# Patient Record
Sex: MALE | Race: WHITE | NOT HISPANIC OR LATINO | URBAN - METROPOLITAN AREA
[De-identification: names, ages, dates, MRNs, and addresses within clinical notes are randomized per-mention and may not be internally consistent; named-entity substitution may affect disease eponyms.]

---

## 2017-01-30 ENCOUNTER — EMERGENCY (EMERGENCY)
Facility: HOSPITAL | Age: 62
LOS: 1 days | Discharge: HOME | End: 2017-01-30
Attending: EMERGENCY MEDICINE | Admitting: EMERGENCY MEDICINE
Payer: MEDICARE

## 2017-01-30 VITALS
TEMPERATURE: 97 F | OXYGEN SATURATION: 97 % | SYSTOLIC BLOOD PRESSURE: 119 MMHG | HEART RATE: 61 BPM | DIASTOLIC BLOOD PRESSURE: 74 MMHG | RESPIRATION RATE: 17 BRPM

## 2017-01-30 DIAGNOSIS — Z90.89 ACQUIRED ABSENCE OF OTHER ORGANS: ICD-10-CM

## 2017-01-30 DIAGNOSIS — R51 HEADACHE: ICD-10-CM

## 2017-01-30 DIAGNOSIS — R30.0 DYSURIA: ICD-10-CM

## 2017-01-30 DIAGNOSIS — I10 ESSENTIAL (PRIMARY) HYPERTENSION: ICD-10-CM

## 2017-01-30 DIAGNOSIS — R33.9 RETENTION OF URINE, UNSPECIFIED: ICD-10-CM

## 2017-01-30 DIAGNOSIS — R00.0 TACHYCARDIA, UNSPECIFIED: ICD-10-CM

## 2017-01-30 PROCEDURE — 99284 EMERGENCY DEPT VISIT MOD MDM: CPT | Mod: 25

## 2017-01-30 PROCEDURE — 93010 ELECTROCARDIOGRAM REPORT: CPT

## 2017-01-30 NOTE — ED ADULT NURSE NOTE - OBJECTIVE STATEMENT
pt is 61 year old male c/o urinary retention x7 hours, pt reports his stomach is distended, +back pain, +chills, no vomiting or diarrhea, no cp, no sob, no dizziness, no fevers, pt had renal failure years ago and needed emergency dialysis, no further dialysis was needed, pt thinks this feels the same

## 2017-01-31 ENCOUNTER — APPOINTMENT (OUTPATIENT)
Dept: UROLOGY | Facility: CLINIC | Age: 62
End: 2017-01-31

## 2017-01-31 VITALS
HEART RATE: 63 BPM | SYSTOLIC BLOOD PRESSURE: 135 MMHG | DIASTOLIC BLOOD PRESSURE: 79 MMHG | RESPIRATION RATE: 18 BRPM | OXYGEN SATURATION: 98 %

## 2017-01-31 LAB
ALBUMIN SERPL ELPH-MCNC: 4.3 G/DL — SIGNIFICANT CHANGE UP (ref 3.3–5)
ALP SERPL-CCNC: 43 U/L — SIGNIFICANT CHANGE UP (ref 40–120)
ALT FLD-CCNC: 22 U/L RC — SIGNIFICANT CHANGE UP (ref 10–45)
ANION GAP SERPL CALC-SCNC: 12 MMOL/L — SIGNIFICANT CHANGE UP (ref 5–17)
APPEARANCE UR: CLEAR — SIGNIFICANT CHANGE UP
AST SERPL-CCNC: 22 U/L — SIGNIFICANT CHANGE UP (ref 10–40)
BASOPHILS # BLD AUTO: 0 K/UL — SIGNIFICANT CHANGE UP (ref 0–0.2)
BASOPHILS NFR BLD AUTO: 0.5 % — SIGNIFICANT CHANGE UP (ref 0–2)
BILIRUB SERPL-MCNC: 0.5 MG/DL — SIGNIFICANT CHANGE UP (ref 0.2–1.2)
BILIRUB UR-MCNC: NEGATIVE — SIGNIFICANT CHANGE UP
BUN SERPL-MCNC: 14 MG/DL — SIGNIFICANT CHANGE UP (ref 7–23)
CALCIUM SERPL-MCNC: 9.1 MG/DL — SIGNIFICANT CHANGE UP (ref 8.4–10.5)
CHLORIDE SERPL-SCNC: 102 MMOL/L — SIGNIFICANT CHANGE UP (ref 96–108)
CO2 SERPL-SCNC: 27 MMOL/L — SIGNIFICANT CHANGE UP (ref 22–31)
COLOR SPEC: SIGNIFICANT CHANGE UP
CREAT SERPL-MCNC: 0.83 MG/DL — SIGNIFICANT CHANGE UP (ref 0.5–1.3)
DIFF PNL FLD: NEGATIVE — SIGNIFICANT CHANGE UP
EOSINOPHIL # BLD AUTO: 0.1 K/UL — SIGNIFICANT CHANGE UP (ref 0–0.5)
EOSINOPHIL NFR BLD AUTO: 1.1 % — SIGNIFICANT CHANGE UP (ref 0–6)
GLUCOSE SERPL-MCNC: 99 MG/DL — SIGNIFICANT CHANGE UP (ref 70–99)
GLUCOSE UR QL: NEGATIVE — SIGNIFICANT CHANGE UP
HCT VFR BLD CALC: 43.5 % — SIGNIFICANT CHANGE UP (ref 39–50)
HGB BLD-MCNC: 14.6 G/DL — SIGNIFICANT CHANGE UP (ref 13–17)
KETONES UR-MCNC: NEGATIVE — SIGNIFICANT CHANGE UP
LEUKOCYTE ESTERASE UR-ACNC: NEGATIVE — SIGNIFICANT CHANGE UP
LYMPHOCYTES # BLD AUTO: 2.6 K/UL — SIGNIFICANT CHANGE UP (ref 1–3.3)
LYMPHOCYTES # BLD AUTO: 30.4 % — SIGNIFICANT CHANGE UP (ref 13–44)
MCHC RBC-ENTMCNC: 30.6 PG — SIGNIFICANT CHANGE UP (ref 27–34)
MCHC RBC-ENTMCNC: 33.6 GM/DL — SIGNIFICANT CHANGE UP (ref 32–36)
MCV RBC AUTO: 91 FL — SIGNIFICANT CHANGE UP (ref 80–100)
MONOCYTES # BLD AUTO: 0.7 K/UL — SIGNIFICANT CHANGE UP (ref 0–0.9)
MONOCYTES NFR BLD AUTO: 8.8 % — SIGNIFICANT CHANGE UP (ref 2–14)
NEUTROPHILS # BLD AUTO: 5 K/UL — SIGNIFICANT CHANGE UP (ref 1.8–7.4)
NEUTROPHILS NFR BLD AUTO: 59.2 % — SIGNIFICANT CHANGE UP (ref 43–77)
NITRITE UR-MCNC: NEGATIVE — SIGNIFICANT CHANGE UP
PH UR: 6.5 — SIGNIFICANT CHANGE UP (ref 4.8–8)
PLATELET # BLD AUTO: 207 K/UL — SIGNIFICANT CHANGE UP (ref 150–400)
POTASSIUM SERPL-MCNC: 4.1 MMOL/L — SIGNIFICANT CHANGE UP (ref 3.5–5.3)
POTASSIUM SERPL-SCNC: 4.1 MMOL/L — SIGNIFICANT CHANGE UP (ref 3.5–5.3)
PROT SERPL-MCNC: 6.7 G/DL — SIGNIFICANT CHANGE UP (ref 6–8.3)
PROT UR-MCNC: NEGATIVE — SIGNIFICANT CHANGE UP
RBC # BLD: 4.78 M/UL — SIGNIFICANT CHANGE UP (ref 4.2–5.8)
RBC # FLD: 12.7 % — SIGNIFICANT CHANGE UP (ref 10.3–14.5)
RBC CASTS # UR COMP ASSIST: SIGNIFICANT CHANGE UP /HPF (ref 0–2)
SODIUM SERPL-SCNC: 141 MMOL/L — SIGNIFICANT CHANGE UP (ref 135–145)
SP GR SPEC: <1.005 — LOW (ref 1.01–1.02)
UROBILINOGEN FLD QL: NEGATIVE — SIGNIFICANT CHANGE UP
WBC # BLD: 8.4 K/UL — SIGNIFICANT CHANGE UP (ref 3.8–10.5)
WBC # FLD AUTO: 8.4 K/UL — SIGNIFICANT CHANGE UP (ref 3.8–10.5)
WBC UR QL: SIGNIFICANT CHANGE UP /HPF (ref 0–5)

## 2017-01-31 PROCEDURE — 85027 COMPLETE CBC AUTOMATED: CPT

## 2017-01-31 PROCEDURE — 81001 URINALYSIS AUTO W/SCOPE: CPT

## 2017-01-31 PROCEDURE — 93005 ELECTROCARDIOGRAM TRACING: CPT | Mod: XU

## 2017-01-31 PROCEDURE — 80053 COMPREHEN METABOLIC PANEL: CPT

## 2017-01-31 PROCEDURE — 51702 INSERT TEMP BLADDER CATH: CPT

## 2017-01-31 PROCEDURE — 99283 EMERGENCY DEPT VISIT LOW MDM: CPT | Mod: 25

## 2017-01-31 PROCEDURE — 87086 URINE CULTURE/COLONY COUNT: CPT

## 2017-01-31 RX ORDER — LIDOCAINE HCL 20 MG/ML
10 VIAL (ML) INJECTION ONCE
Qty: 0 | Refills: 0 | Status: COMPLETED | OUTPATIENT
Start: 2017-01-31 | End: 2017-01-31

## 2017-01-31 RX ADMIN — Medication 10 MILLILITER(S): at 02:24

## 2017-01-31 NOTE — ED PROVIDER NOTE - PLAN OF CARE
Follow-up with urology [phone] within 1-2 days for your urinary retention. Keep kovacs in place until you see the urologist. Return to an ER for bleeding at kovacs site, decreased urine output from kovacs, fever, vomiting, or any other concerns.

## 2017-01-31 NOTE — ED PROVIDER NOTE - ATTENDING CONTRIBUTION TO CARE
62 yo male with PMH of bipolar disorder who presents with decreased urination since 9am with increasing pain and stomach distention since 11pm. Mild chills, mild nausea, and some diarrhea. No fevers, No heamturia or blood in the stool. Hx of AK 2/2 to lithium toxicity. On exam, distended abdomen, full, has about 800cc on bladder scan. No pallor, MMM, no edema to ext.

## 2017-01-31 NOTE — ED PROVIDER NOTE - OBJECTIVE STATEMENT
61yM h/o HTN, bipolar disorder, h/o renal failure requiring dialysis in 2004 2/2 lithium (no carly 61yM h/o HTN, bipolar disorder, h/o renal failure requiring dialysis in 2004 2/2 lithium (no longer on dialysis or lithium) presents with one day (7 hours) of urinary retention and abdominal distention and b/l flank pain. patient reports same symptoms before going into renal failure. While in waiting room in ER was able to urinate small amount, feels less abdominal discomfort/distention. No h/o BPH. +nausea and chills. No vomiting.

## 2017-01-31 NOTE — ED ADULT NURSE REASSESSMENT NOTE - NS ED NURSE REASSESS COMMENT FT1
bladder scanned pt, 873cc of urine in bladder
kovacs inserted using sterile technique/pt tolerated well/drained 550 of clear yellow urine/secured to leg with stat lock
pt resting comfortable/relief with kovacs cath/awaiting dispo
switched kovacs bag to leg bag using sterile technique/pt feeling much better

## 2017-01-31 NOTE — ED PROVIDER NOTE - CARE PLAN
Principal Discharge DX:	Urinary retention  Instructions for follow-up, activity and diet:	Follow-up with urology [phone] within 1-2 days for your urinary retention. Keep kovacs in place until you see the urologist. Return to an ER for bleeding at kovacs site, decreased urine output from kovacs, fever, vomiting, or any other concerns. Principal Discharge DX:	Urinary retention  Instructions for follow-up, activity and diet:	Follow-up with urology [phone] within 1-2 days for your urinary retention. Keep kovacs in place until you see the urologist. Return to an ER for bleeding at kovacs site, decreased urine output from kovacs, fever, vomiting, or any other concerns.

## 2017-02-01 LAB
CULTURE RESULTS: NO GROWTH — SIGNIFICANT CHANGE UP
SPECIMEN SOURCE: SIGNIFICANT CHANGE UP

## 2017-02-14 ENCOUNTER — APPOINTMENT (OUTPATIENT)
Dept: UROLOGY | Facility: CLINIC | Age: 62
End: 2017-02-14

## 2017-02-24 ENCOUNTER — APPOINTMENT (OUTPATIENT)
Dept: UROLOGY | Facility: CLINIC | Age: 62
End: 2017-02-24

## 2017-02-24 VITALS — SYSTOLIC BLOOD PRESSURE: 110 MMHG | DIASTOLIC BLOOD PRESSURE: 80 MMHG | HEART RATE: 86 BPM | OXYGEN SATURATION: 98 %

## 2017-04-06 ENCOUNTER — APPOINTMENT (OUTPATIENT)
Dept: UROLOGY | Facility: CLINIC | Age: 62
End: 2017-04-06

## 2017-04-06 VITALS — SYSTOLIC BLOOD PRESSURE: 143 MMHG | OXYGEN SATURATION: 98 % | HEART RATE: 108 BPM | DIASTOLIC BLOOD PRESSURE: 80 MMHG

## 2017-04-06 DIAGNOSIS — R33.8 OTHER RETENTION OF URINE: ICD-10-CM

## 2017-04-06 DIAGNOSIS — Z86.59 PERSONAL HISTORY OF OTHER MENTAL AND BEHAVIORAL DISORDERS: ICD-10-CM

## 2017-04-06 DIAGNOSIS — Z87.19 PERSONAL HISTORY OF OTHER DISEASES OF THE DIGESTIVE SYSTEM: ICD-10-CM

## 2017-04-06 DIAGNOSIS — Z87.81 PERSONAL HISTORY OF (HEALED) TRAUMATIC FRACTURE: ICD-10-CM

## 2017-04-06 DIAGNOSIS — Z86.39 PERSONAL HISTORY OF OTHER ENDOCRINE, NUTRITIONAL AND METABOLIC DISEASE: ICD-10-CM

## 2017-04-06 DIAGNOSIS — Z87.891 PERSONAL HISTORY OF NICOTINE DEPENDENCE: ICD-10-CM

## 2017-04-06 DIAGNOSIS — Z80.2 FAMILY HISTORY OF MALIGNANT NEOPLASM OF OTHER RESPIRATORY AND INTRATHORACIC ORGANS: ICD-10-CM

## 2017-04-06 DIAGNOSIS — N40.0 BENIGN PROSTATIC HYPERPLASIA WITHOUT LOWER URINARY TRACT SYMPMS: ICD-10-CM

## 2017-04-06 DIAGNOSIS — Z86.79 PERSONAL HISTORY OF OTHER DISEASES OF THE CIRCULATORY SYSTEM: ICD-10-CM

## 2017-04-06 DIAGNOSIS — Z80.42 FAMILY HISTORY OF MALIGNANT NEOPLASM OF PROSTATE: ICD-10-CM

## 2017-04-06 RX ORDER — CHLOROPHYLLIN COPPER COMPLEX 100MG/0.71
DROPS ORAL
Refills: 0 | Status: ACTIVE | COMMUNITY

## 2017-04-06 RX ORDER — DULOXETINE HYDROCHLORIDE 30 MG/1
30 CAPSULE, DELAYED RELEASE ORAL
Refills: 0 | Status: ACTIVE | COMMUNITY

## 2017-04-06 RX ORDER — TAMSULOSIN HYDROCHLORIDE 0.4 MG/1
0.4 CAPSULE ORAL
Qty: 7 | Refills: 0 | Status: DISCONTINUED | COMMUNITY
Start: 2017-02-24 | End: 2017-04-06

## 2017-04-06 RX ORDER — CLONAZEPAM 1 MG/1
1 TABLET ORAL
Refills: 0 | Status: ACTIVE | COMMUNITY

## 2017-04-06 RX ORDER — DIAZEPAM 10 MG/1
10 TABLET ORAL
Refills: 0 | Status: ACTIVE | COMMUNITY

## 2017-04-06 RX ORDER — AMOXICILLIN 250 MG
CAPSULE ORAL
Refills: 0 | Status: ACTIVE | COMMUNITY

## 2017-04-06 RX ORDER — METHYLSULFONYLMETHANE 1000 MG
1000 TABLET ORAL
Refills: 0 | Status: ACTIVE | COMMUNITY

## 2017-04-06 RX ORDER — VITAMIN A 2400 MCG
10000 CAPSULE ORAL
Refills: 0 | Status: ACTIVE | COMMUNITY

## 2017-04-06 RX ORDER — MULTIVIT-MIN/FOLIC/VIT K/LYCOP 400-300MCG
1000 TABLET ORAL
Refills: 0 | Status: ACTIVE | COMMUNITY

## 2017-04-07 ENCOUNTER — APPOINTMENT (OUTPATIENT)
Dept: UROLOGY | Facility: CLINIC | Age: 62
End: 2017-04-07

## 2017-04-10 LAB
APPEARANCE: CLEAR
BACTERIA UR CULT: NORMAL
BACTERIA: NEGATIVE
BILIRUBIN URINE: NEGATIVE
BLOOD URINE: NEGATIVE
COLOR: YELLOW
GLUCOSE QUALITATIVE U: NORMAL MG/DL
KETONES URINE: NEGATIVE
LEUKOCYTE ESTERASE URINE: NEGATIVE
MICROSCOPIC-UA: NORMAL
NITRITE URINE: NEGATIVE
PH URINE: 6.5
PROTEIN URINE: NEGATIVE MG/DL
RED BLOOD CELLS URINE: 2 /HPF
SPECIFIC GRAVITY URINE: 1.01
SQUAMOUS EPITHELIAL CELLS: 0 /HPF
UROBILINOGEN URINE: NORMAL MG/DL
WHITE BLOOD CELLS URINE: 1 /HPF

## 2017-04-17 ENCOUNTER — APPOINTMENT (OUTPATIENT)
Dept: SURGERY | Facility: CLINIC | Age: 62
End: 2017-04-17

## 2017-04-17 VITALS
WEIGHT: 159 LBS | TEMPERATURE: 98.6 F | BODY MASS INDEX: 22.76 KG/M2 | SYSTOLIC BLOOD PRESSURE: 140 MMHG | DIASTOLIC BLOOD PRESSURE: 81 MMHG | HEIGHT: 70 IN | HEART RATE: 93 BPM

## 2017-04-24 ENCOUNTER — OUTPATIENT (OUTPATIENT)
Dept: OUTPATIENT SERVICES | Facility: HOSPITAL | Age: 62
LOS: 1 days | Discharge: HOME | End: 2017-04-24

## 2017-04-24 VITALS
SYSTOLIC BLOOD PRESSURE: 129 MMHG | OXYGEN SATURATION: 75 % | HEIGHT: 69.5 IN | RESPIRATION RATE: 16 BRPM | DIASTOLIC BLOOD PRESSURE: 88 MMHG | TEMPERATURE: 98 F | WEIGHT: 152.78 LBS | HEART RATE: 75 BPM

## 2017-04-24 DIAGNOSIS — Z98.890 OTHER SPECIFIED POSTPROCEDURAL STATES: Chronic | ICD-10-CM

## 2017-04-24 DIAGNOSIS — Z01.818 ENCOUNTER FOR OTHER PREPROCEDURAL EXAMINATION: ICD-10-CM

## 2017-04-24 DIAGNOSIS — K40.90 UNILATERAL INGUINAL HERNIA, WITHOUT OBSTRUCTION OR GANGRENE, NOT SPECIFIED AS RECURRENT: ICD-10-CM

## 2017-04-24 DIAGNOSIS — F43.10 POST-TRAUMATIC STRESS DISORDER, UNSPECIFIED: ICD-10-CM

## 2017-04-24 DIAGNOSIS — Z90.49 ACQUIRED ABSENCE OF OTHER SPECIFIED PARTS OF DIGESTIVE TRACT: Chronic | ICD-10-CM

## 2017-04-24 LAB
ANION GAP SERPL CALC-SCNC: 10 MMOL/L — SIGNIFICANT CHANGE UP (ref 5–17)
APTT BLD: 31.8 SEC — SIGNIFICANT CHANGE UP (ref 27.5–37.4)
BASOPHILS # BLD AUTO: 0 K/UL — SIGNIFICANT CHANGE UP (ref 0–0.2)
BASOPHILS NFR BLD AUTO: 0.7 % — SIGNIFICANT CHANGE UP (ref 0–2)
BUN SERPL-MCNC: 18 MG/DL — SIGNIFICANT CHANGE UP (ref 7–23)
CALCIUM SERPL-MCNC: 8.9 MG/DL — SIGNIFICANT CHANGE UP (ref 8.5–10.1)
CHLORIDE SERPL-SCNC: 104 MMOL/L — SIGNIFICANT CHANGE UP (ref 96–108)
CO2 SERPL-SCNC: 27 MMOL/L — SIGNIFICANT CHANGE UP (ref 22–31)
CREAT SERPL-MCNC: 0.74 MG/DL — SIGNIFICANT CHANGE UP (ref 0.5–1.3)
EOSINOPHIL # BLD AUTO: 0.1 K/UL — SIGNIFICANT CHANGE UP (ref 0–0.5)
EOSINOPHIL NFR BLD AUTO: 1 % — SIGNIFICANT CHANGE UP (ref 0–6)
GLUCOSE SERPL-MCNC: 101 MG/DL — HIGH (ref 70–99)
HCT VFR BLD CALC: 44.8 % — SIGNIFICANT CHANGE UP (ref 39–50)
HGB BLD-MCNC: 15.8 G/DL — SIGNIFICANT CHANGE UP (ref 13–17)
HIV 1 & 2 AB SERPL IA.RAPID: SIGNIFICANT CHANGE UP
INR BLD: 0.97 RATIO — SIGNIFICANT CHANGE UP (ref 0.88–1.16)
LYMPHOCYTES # BLD AUTO: 1.3 K/UL — SIGNIFICANT CHANGE UP (ref 1–3.3)
LYMPHOCYTES # BLD AUTO: 19.3 % — SIGNIFICANT CHANGE UP (ref 13–44)
MCHC RBC-ENTMCNC: 30.9 PG — SIGNIFICANT CHANGE UP (ref 27–34)
MCHC RBC-ENTMCNC: 35.1 GM/DL — SIGNIFICANT CHANGE UP (ref 32–36)
MCV RBC AUTO: 87.9 FL — SIGNIFICANT CHANGE UP (ref 80–100)
MONOCYTES # BLD AUTO: 0.6 K/UL — SIGNIFICANT CHANGE UP (ref 0–0.9)
MONOCYTES NFR BLD AUTO: 8.5 % — SIGNIFICANT CHANGE UP (ref 2–14)
NEUTROPHILS # BLD AUTO: 4.9 K/UL — SIGNIFICANT CHANGE UP (ref 1.8–7.4)
NEUTROPHILS NFR BLD AUTO: 70.5 % — SIGNIFICANT CHANGE UP (ref 43–77)
PLATELET # BLD AUTO: 260 K/UL — SIGNIFICANT CHANGE UP (ref 150–400)
POTASSIUM SERPL-MCNC: 4.3 MMOL/L — SIGNIFICANT CHANGE UP (ref 3.5–5.3)
POTASSIUM SERPL-SCNC: 4.3 MMOL/L — SIGNIFICANT CHANGE UP (ref 3.5–5.3)
PROTHROM AB SERPL-ACNC: 10.6 SEC — SIGNIFICANT CHANGE UP (ref 9.8–12.7)
RBC # BLD: 5.1 M/UL — SIGNIFICANT CHANGE UP (ref 4.2–5.8)
RBC # FLD: 13.3 % — SIGNIFICANT CHANGE UP (ref 11–15)
SODIUM SERPL-SCNC: 141 MMOL/L — SIGNIFICANT CHANGE UP (ref 135–145)
WBC # BLD: 6.9 K/UL — SIGNIFICANT CHANGE UP (ref 3.8–10.5)
WBC # FLD AUTO: 6.9 K/UL — SIGNIFICANT CHANGE UP (ref 3.8–10.5)

## 2017-04-24 RX ORDER — DIAZEPAM 5 MG
1 TABLET ORAL
Qty: 0 | Refills: 0 | COMMUNITY

## 2017-04-24 RX ORDER — TRAZODONE HCL 50 MG
1 TABLET ORAL
Qty: 0 | Refills: 0 | COMMUNITY

## 2017-04-24 RX ORDER — CLONAZEPAM 1 MG
1 TABLET ORAL
Qty: 0 | Refills: 0 | COMMUNITY

## 2017-04-24 RX ORDER — TAMSULOSIN HYDROCHLORIDE 0.4 MG/1
1 CAPSULE ORAL
Qty: 0 | Refills: 0 | COMMUNITY

## 2017-04-24 NOTE — H&P PST ADULT - VISION (WITH CORRECTIVE LENSES IF THE PATIENT USUALLY WEARS THEM):
Wear Glasses/Partially impaired: cannot see medication labels or newsprint, but can see obstacles in path, and the surrounding layout; can count fingers at arm's length

## 2017-04-24 NOTE — H&P PST ADULT - ABILITY TO HEAR (WITH HEARING AID OR HEARING APPLIANCE IF NORMALLY USED):
Mildly to Moderately Impaired: difficulty hearing in some environments or speaker may need to increase volume or speak distinctly/Bilateral Hearing Aids

## 2017-04-24 NOTE — H&P PST ADULT - NSANTHOSAYNRD_GEN_A_CORE
No. JUSTIN screening performed.  STOP BANG Legend: 0-2 = LOW Risk; 3-4 = INTERMEDIATE Risk; 5-8 = HIGH Risk

## 2017-04-24 NOTE — H&P PST ADULT - PMH
Anxiety    HTN (hypertension)    PTSD (post-traumatic stress disorder) Anxiety    BPH (benign prostatic hypertrophy)    HTN (hypertension)    PTSD (post-traumatic stress disorder)    Renal failure  for 1 day in 2004 secomndary to lithium  Urinary retention

## 2017-05-02 ENCOUNTER — APPOINTMENT (OUTPATIENT)
Dept: SURGERY | Facility: HOSPITAL | Age: 62
End: 2017-05-02

## 2017-05-18 ENCOUNTER — APPOINTMENT (OUTPATIENT)
Dept: SURGERY | Facility: HOSPITAL | Age: 62
End: 2017-05-18

## 2017-06-02 ENCOUNTER — OUTPATIENT (OUTPATIENT)
Dept: OUTPATIENT SERVICES | Facility: HOSPITAL | Age: 62
LOS: 1 days | Discharge: HOME | End: 2017-06-02

## 2017-06-02 VITALS
TEMPERATURE: 98 F | OXYGEN SATURATION: 98 % | RESPIRATION RATE: 18 BRPM | WEIGHT: 145.28 LBS | HEART RATE: 104 BPM | DIASTOLIC BLOOD PRESSURE: 80 MMHG | SYSTOLIC BLOOD PRESSURE: 146 MMHG | HEIGHT: 69 IN

## 2017-06-02 DIAGNOSIS — Z01.818 ENCOUNTER FOR OTHER PREPROCEDURAL EXAMINATION: ICD-10-CM

## 2017-06-02 DIAGNOSIS — Z98.890 OTHER SPECIFIED POSTPROCEDURAL STATES: Chronic | ICD-10-CM

## 2017-06-02 DIAGNOSIS — K40.90 UNILATERAL INGUINAL HERNIA, WITHOUT OBSTRUCTION OR GANGRENE, NOT SPECIFIED AS RECURRENT: ICD-10-CM

## 2017-06-02 DIAGNOSIS — I10 ESSENTIAL (PRIMARY) HYPERTENSION: ICD-10-CM

## 2017-06-02 DIAGNOSIS — N40.0 BENIGN PROSTATIC HYPERPLASIA WITHOUT LOWER URINARY TRACT SYMPTOMS: ICD-10-CM

## 2017-06-02 DIAGNOSIS — Z90.49 ACQUIRED ABSENCE OF OTHER SPECIFIED PARTS OF DIGESTIVE TRACT: Chronic | ICD-10-CM

## 2017-06-02 LAB
AMPHET UR-MCNC: NEGATIVE — SIGNIFICANT CHANGE UP
ANION GAP SERPL CALC-SCNC: 9 MMOL/L — SIGNIFICANT CHANGE UP (ref 5–17)
BARBITURATES UR SCN-MCNC: NEGATIVE — SIGNIFICANT CHANGE UP
BASOPHILS # BLD AUTO: 0.1 K/UL — SIGNIFICANT CHANGE UP (ref 0–0.2)
BASOPHILS NFR BLD AUTO: 1 % — SIGNIFICANT CHANGE UP (ref 0–2)
BENZODIAZ UR-MCNC: POSITIVE — SIGNIFICANT CHANGE UP
BUN SERPL-MCNC: 12 MG/DL — SIGNIFICANT CHANGE UP (ref 7–23)
CALCIUM SERPL-MCNC: 9 MG/DL — SIGNIFICANT CHANGE UP (ref 8.5–10.1)
CHLORIDE SERPL-SCNC: 107 MMOL/L — SIGNIFICANT CHANGE UP (ref 96–108)
CO2 SERPL-SCNC: 27 MMOL/L — SIGNIFICANT CHANGE UP (ref 22–31)
COCAINE METAB.OTHER UR-MCNC: NEGATIVE — SIGNIFICANT CHANGE UP
CREAT SERPL-MCNC: 0.85 MG/DL — SIGNIFICANT CHANGE UP (ref 0.5–1.3)
EOSINOPHIL # BLD AUTO: 0.1 K/UL — SIGNIFICANT CHANGE UP (ref 0–0.5)
EOSINOPHIL NFR BLD AUTO: 1.5 % — SIGNIFICANT CHANGE UP (ref 0–6)
GLUCOSE SERPL-MCNC: 128 MG/DL — HIGH (ref 70–99)
HCT VFR BLD CALC: 44.9 % — SIGNIFICANT CHANGE UP (ref 39–50)
HGB BLD-MCNC: 16 G/DL — SIGNIFICANT CHANGE UP (ref 13–17)
LYMPHOCYTES # BLD AUTO: 1.9 K/UL — SIGNIFICANT CHANGE UP (ref 1–3.3)
LYMPHOCYTES # BLD AUTO: 25.2 % — SIGNIFICANT CHANGE UP (ref 13–44)
MCHC RBC-ENTMCNC: 30.6 PG — SIGNIFICANT CHANGE UP (ref 27–34)
MCHC RBC-ENTMCNC: 35.7 GM/DL — SIGNIFICANT CHANGE UP (ref 32–36)
MCV RBC AUTO: 85.9 FL — SIGNIFICANT CHANGE UP (ref 80–100)
METHADONE UR-MCNC: NEGATIVE — SIGNIFICANT CHANGE UP
MONOCYTES # BLD AUTO: 0.6 K/UL — SIGNIFICANT CHANGE UP (ref 0–0.9)
MONOCYTES NFR BLD AUTO: 7.8 % — SIGNIFICANT CHANGE UP (ref 2–14)
NEUTROPHILS # BLD AUTO: 4.8 K/UL — SIGNIFICANT CHANGE UP (ref 1.8–7.4)
NEUTROPHILS NFR BLD AUTO: 64.5 % — SIGNIFICANT CHANGE UP (ref 43–77)
OPIATES UR-MCNC: NEGATIVE — SIGNIFICANT CHANGE UP
PCP SPEC-MCNC: SIGNIFICANT CHANGE UP
PCP UR-MCNC: NEGATIVE — SIGNIFICANT CHANGE UP
PLATELET # BLD AUTO: 263 K/UL — SIGNIFICANT CHANGE UP (ref 150–400)
POTASSIUM SERPL-MCNC: 3.7 MMOL/L — SIGNIFICANT CHANGE UP (ref 3.5–5.3)
POTASSIUM SERPL-SCNC: 3.7 MMOL/L — SIGNIFICANT CHANGE UP (ref 3.5–5.3)
RBC # BLD: 5.23 M/UL — SIGNIFICANT CHANGE UP (ref 4.2–5.8)
RBC # FLD: 12.7 % — SIGNIFICANT CHANGE UP (ref 11–15)
SODIUM SERPL-SCNC: 143 MMOL/L — SIGNIFICANT CHANGE UP (ref 135–145)
THC UR QL: NEGATIVE — SIGNIFICANT CHANGE UP
WBC # BLD: 7.4 K/UL — SIGNIFICANT CHANGE UP (ref 3.8–10.5)
WBC # FLD AUTO: 7.4 K/UL — SIGNIFICANT CHANGE UP (ref 3.8–10.5)

## 2017-06-02 NOTE — ASU PATIENT PROFILE, ADULT - PMH
Anxiety    BPH (benign prostatic hypertrophy)    HTN (hypertension)    PTSD (post-traumatic stress disorder)    Renal failure  for 1 day in 2004 secomndary to lithium  Urinary retention

## 2017-06-02 NOTE — ASU PATIENT PROFILE, ADULT - ABILITY TO HEAR (WITH HEARING AID OR HEARING APPLIANCE IF NORMALLY USED):
Bilateral Hearing aids/Mildly to Moderately Impaired: difficulty hearing in some environments or speaker may need to increase volume or speak distinctly

## 2017-06-02 NOTE — H&P PST ADULT - FAMILY HISTORY
Mother  Still living? Unknown  Family history of diabetes mellitus, Age at diagnosis: Age Unknown  Family history of stroke, Age at diagnosis: Age Unknown     Father  Still living? Unknown  Family history of lymphoma, Age at diagnosis: Age Unknown

## 2017-06-02 NOTE — H&P PST ADULT - RS GEN PE MLT RESP DETAILS PC
no rales/no rhonchi/clear to auscultation bilaterally/no chest wall tenderness/no wheezes/respirations non-labored

## 2017-06-02 NOTE — ASU PATIENT PROFILE, ADULT - PSH
H/O oral surgery    History of tonsillectomy    S/P cholecystectomy    S/P ear surgery  tube placed right ear

## 2017-06-02 NOTE — H&P PST ADULT - HISTORY OF PRESENT ILLNESS
Patient is a 61 year old male with right inguinal hernia, found incidentally on examination. Patient denies related discomfort.

## 2017-06-13 LAB — PCP SPEC-MCNC: SIGNIFICANT CHANGE UP

## 2017-06-15 ENCOUNTER — OUTPATIENT (OUTPATIENT)
Dept: OUTPATIENT SERVICES | Facility: HOSPITAL | Age: 62
LOS: 1 days | Discharge: HOME | End: 2017-06-15
Payer: MEDICARE

## 2017-06-15 ENCOUNTER — APPOINTMENT (OUTPATIENT)
Dept: SURGERY | Facility: HOSPITAL | Age: 62
End: 2017-06-15

## 2017-06-15 ENCOUNTER — TRANSCRIPTION ENCOUNTER (OUTPATIENT)
Age: 62
End: 2017-06-15

## 2017-06-15 VITALS
OXYGEN SATURATION: 98 % | WEIGHT: 149.91 LBS | HEART RATE: 89 BPM | DIASTOLIC BLOOD PRESSURE: 73 MMHG | SYSTOLIC BLOOD PRESSURE: 117 MMHG | RESPIRATION RATE: 16 BRPM | HEIGHT: 69 IN | TEMPERATURE: 98 F

## 2017-06-15 VITALS
DIASTOLIC BLOOD PRESSURE: 70 MMHG | SYSTOLIC BLOOD PRESSURE: 136 MMHG | OXYGEN SATURATION: 99 % | RESPIRATION RATE: 16 BRPM | HEART RATE: 70 BPM

## 2017-06-15 DIAGNOSIS — Z90.49 ACQUIRED ABSENCE OF OTHER SPECIFIED PARTS OF DIGESTIVE TRACT: Chronic | ICD-10-CM

## 2017-06-15 DIAGNOSIS — Z98.890 OTHER SPECIFIED POSTPROCEDURAL STATES: Chronic | ICD-10-CM

## 2017-06-15 LAB — PCP SPEC-MCNC: SIGNIFICANT CHANGE UP

## 2017-06-15 PROCEDURE — 49505 PRP I/HERN INIT REDUC >5 YR: CPT | Mod: AS

## 2017-06-15 PROCEDURE — 49505 PRP I/HERN INIT REDUC >5 YR: CPT

## 2017-06-15 RX ORDER — SODIUM CHLORIDE 9 MG/ML
1000 INJECTION, SOLUTION INTRAVENOUS
Qty: 0 | Refills: 0 | Status: DISCONTINUED | OUTPATIENT
Start: 2017-06-15 | End: 2017-06-15

## 2017-06-15 RX ORDER — ONDANSETRON 8 MG/1
4 TABLET, FILM COATED ORAL ONCE
Qty: 0 | Refills: 0 | Status: DISCONTINUED | OUTPATIENT
Start: 2017-06-15 | End: 2017-06-15

## 2017-06-15 RX ORDER — ACETAMINOPHEN 500 MG
1000 TABLET ORAL ONCE
Qty: 0 | Refills: 0 | Status: COMPLETED | OUTPATIENT
Start: 2017-06-15 | End: 2017-06-15

## 2017-06-15 RX ORDER — HYDROMORPHONE HYDROCHLORIDE 2 MG/ML
0.5 INJECTION INTRAMUSCULAR; INTRAVENOUS; SUBCUTANEOUS
Qty: 0 | Refills: 0 | Status: DISCONTINUED | OUTPATIENT
Start: 2017-06-15 | End: 2017-06-15

## 2017-06-15 RX ORDER — SODIUM CHLORIDE 9 MG/ML
3 INJECTION INTRAMUSCULAR; INTRAVENOUS; SUBCUTANEOUS EVERY 8 HOURS
Qty: 0 | Refills: 0 | Status: DISCONTINUED | OUTPATIENT
Start: 2017-06-15 | End: 2017-06-15

## 2017-06-15 RX ORDER — SODIUM CHLORIDE 9 MG/ML
1000 INJECTION, SOLUTION INTRAVENOUS
Qty: 0 | Refills: 0 | Status: DISCONTINUED | OUTPATIENT
Start: 2017-06-15 | End: 2017-06-30

## 2017-06-15 RX ADMIN — Medication 400 MILLIGRAM(S): at 15:27

## 2017-06-15 RX ADMIN — Medication 1000 MILLIGRAM(S): at 15:50

## 2017-06-15 RX ADMIN — SODIUM CHLORIDE 100 MILLILITER(S): 9 INJECTION, SOLUTION INTRAVENOUS at 14:37

## 2017-06-15 RX ADMIN — HYDROMORPHONE HYDROCHLORIDE 0.5 MILLIGRAM(S): 2 INJECTION INTRAMUSCULAR; INTRAVENOUS; SUBCUTANEOUS at 14:36

## 2017-06-15 RX ADMIN — HYDROMORPHONE HYDROCHLORIDE 0.5 MILLIGRAM(S): 2 INJECTION INTRAMUSCULAR; INTRAVENOUS; SUBCUTANEOUS at 15:19

## 2017-06-15 NOTE — ASU DISCHARGE PLAN (ADULT/PEDIATRIC). - NOTIFY
Unable to Urinate/Persistent Nausea and Vomiting/Bleeding that does not stop/Swelling that continues/Fever greater than 101

## 2017-06-15 NOTE — ASU PATIENT PROFILE, ADULT - PMH
Anxiety    BPH (benign prostatic hypertrophy)    HTN (hypertension)    PTSD (post-traumatic stress disorder)    Renal failure  for 1 day in 2004 secomndary to lithium  Urinary retention Anxiety    BPH (benign prostatic hypertrophy)    Brain bleed    HTN (hypertension)    PTSD (post-traumatic stress disorder)    Renal failure  for 1 day in 2004 secomndary to lithium  Urinary retention

## 2017-06-15 NOTE — ASU DISCHARGE PLAN (ADULT/PEDIATRIC). - MEDICATION SUMMARY - MEDICATIONS TO TAKE
I will START or STAY ON the medications listed below when I get home from the hospital:    Tylenol 500 mg oral tablet  -- 2 tab(s) by mouth 2 times a day, As Needed  -- Indication: For mild- mod pain    Percocet 5/325 325 mg-5 mg oral tablet  -- 1-2 tab(s) by mouth every 4 to 6 hours, As Needed MDD:6 tabs - for severe pain  -- Caution federal law prohibits the transfer of this drug to any person other  than the person for whom it was prescribed.  May cause drowsiness.  Alcohol may intensify this effect.  Use care when operating dangerous machinery.  This prescription cannot be refilled.  This product contains acetaminophen.  Do not use  with any other product containing acetaminophen to prevent possible liver damage.  Using more of this medication than prescribed may cause serious breathing problems.    -- Indication: For severe pain    Flomax 0.4 mg oral capsule  -- 1 cap(s) by mouth once a day  -- Indication: For .    Valium 5 mg oral tablet  -- 2 tab(s) by mouth 4 times a day  -- Indication: For .    DULoxetine 30 mg oral delayed release capsule  -- 1 tab(s) by mouth once a day  -- Indication: For .    traZODone 150 mg oral tablet  -- 1 tab(s) by mouth once a day (at bedtime)  -- Indication: For .    busPIRone 30 mg oral tablet  -- 1 tab(s) by mouth 2 times a day  -- Indication: For .    buPROPion 300 mg/24 hours (XL) oral tablet, extended release  -- 1 tab(s) by mouth every 24 hours  -- Indication: For .

## 2017-06-15 NOTE — ASU PATIENT PROFILE, ADULT - ABILITY TO HEAR (WITH HEARING AID OR HEARING APPLIANCE IF NORMALLY USED):
Mildly to Moderately Impaired: difficulty hearing in some environments or speaker may need to increase volume or speak distinctly/use hearing aide

## 2017-06-19 DIAGNOSIS — N40.0 BENIGN PROSTATIC HYPERPLASIA WITHOUT LOWER URINARY TRACT SYMPTOMS: ICD-10-CM

## 2017-06-19 DIAGNOSIS — I10 ESSENTIAL (PRIMARY) HYPERTENSION: ICD-10-CM

## 2017-06-19 DIAGNOSIS — K40.90 UNILATERAL INGUINAL HERNIA, WITHOUT OBSTRUCTION OR GANGRENE, NOT SPECIFIED AS RECURRENT: ICD-10-CM

## 2017-06-19 DIAGNOSIS — F41.9 ANXIETY DISORDER, UNSPECIFIED: ICD-10-CM

## 2017-06-26 ENCOUNTER — APPOINTMENT (OUTPATIENT)
Dept: SURGERY | Facility: CLINIC | Age: 62
End: 2017-06-26

## 2017-06-26 VITALS
DIASTOLIC BLOOD PRESSURE: 81 MMHG | HEART RATE: 65 BPM | TEMPERATURE: 97.9 F | BODY MASS INDEX: 21.19 KG/M2 | WEIGHT: 148 LBS | HEIGHT: 70 IN | SYSTOLIC BLOOD PRESSURE: 129 MMHG

## 2017-06-26 RX ORDER — HYDROCODONE BITARTRATE AND ACETAMINOPHEN 5; 325 MG/1; MG/1
5-325 TABLET ORAL
Qty: 16 | Refills: 0 | Status: ACTIVE | COMMUNITY
Start: 2017-04-14

## 2017-06-26 RX ORDER — TRAZODONE HYDROCHLORIDE 150 MG/1
150 TABLET ORAL
Qty: 30 | Refills: 0 | Status: ACTIVE | COMMUNITY
Start: 2017-03-22

## 2017-06-26 RX ORDER — BUSPIRONE HYDROCHLORIDE 30 MG/1
30 TABLET ORAL
Qty: 90 | Refills: 0 | Status: ACTIVE | COMMUNITY
Start: 2017-03-13

## 2017-06-26 RX ORDER — CHLORHEXIDINE GLUCONATE, 0.12% ORAL RINSE 1.2 MG/ML
0.12 SOLUTION DENTAL
Qty: 473 | Refills: 0 | Status: ACTIVE | COMMUNITY
Start: 2017-04-14

## 2017-06-26 RX ORDER — DIAZEPAM 5 MG/1
5 TABLET ORAL
Qty: 112 | Refills: 0 | Status: ACTIVE | COMMUNITY
Start: 2017-04-23

## 2017-06-26 RX ORDER — TRAZODONE HYDROCHLORIDE 50 MG/1
50 TABLET ORAL
Qty: 30 | Refills: 0 | Status: ACTIVE | COMMUNITY
Start: 2017-03-07

## 2017-06-26 RX ORDER — BUPROPION HYDROCHLORIDE 150 MG/1
150 TABLET, EXTENDED RELEASE ORAL
Qty: 30 | Refills: 0 | Status: ACTIVE | COMMUNITY
Start: 2016-12-29

## 2017-07-05 ENCOUNTER — APPOINTMENT (OUTPATIENT)
Dept: SURGERY | Facility: CLINIC | Age: 62
End: 2017-07-05

## 2017-07-05 VITALS
BODY MASS INDEX: 21.19 KG/M2 | DIASTOLIC BLOOD PRESSURE: 74 MMHG | HEART RATE: 85 BPM | HEIGHT: 70 IN | TEMPERATURE: 98 F | SYSTOLIC BLOOD PRESSURE: 118 MMHG | WEIGHT: 148 LBS

## 2017-07-19 ENCOUNTER — APPOINTMENT (OUTPATIENT)
Dept: SURGERY | Facility: CLINIC | Age: 62
End: 2017-07-19

## 2017-07-19 VITALS
HEART RATE: 72 BPM | TEMPERATURE: 97.9 F | HEIGHT: 69 IN | BODY MASS INDEX: 21.77 KG/M2 | DIASTOLIC BLOOD PRESSURE: 77 MMHG | SYSTOLIC BLOOD PRESSURE: 128 MMHG | WEIGHT: 147 LBS

## 2017-08-24 ENCOUNTER — INPATIENT (INPATIENT)
Facility: HOSPITAL | Age: 62
LOS: 6 days | Discharge: HOME | End: 2017-08-31
Attending: PSYCHIATRY & NEUROLOGY | Admitting: PSYCHIATRY & NEUROLOGY
Payer: MEDICARE

## 2017-08-24 VITALS
SYSTOLIC BLOOD PRESSURE: 101 MMHG | HEART RATE: 69 BPM | DIASTOLIC BLOOD PRESSURE: 67 MMHG | OXYGEN SATURATION: 100 % | RESPIRATION RATE: 16 BRPM | TEMPERATURE: 98 F

## 2017-08-24 DIAGNOSIS — Z90.49 ACQUIRED ABSENCE OF OTHER SPECIFIED PARTS OF DIGESTIVE TRACT: Chronic | ICD-10-CM

## 2017-08-24 DIAGNOSIS — Z98.890 OTHER SPECIFIED POSTPROCEDURAL STATES: Chronic | ICD-10-CM

## 2017-08-24 NOTE — ED ADULT TRIAGE NOTE - CHIEF COMPLAINT QUOTE
pt states "I suffer from PTSD and use crystal meth and would like to be checked in and evaluated". last drug usage 5 days ago. also had thoughts of suicide this week. denies SI/HI at this time.  attending called, pt to be seen in .

## 2017-08-25 DIAGNOSIS — F19.19 OTHER PSYCHOACTIVE SUBSTANCE ABUSE WITH UNSPECIFIED PSYCHOACTIVE SUBSTANCE-INDUCED DISORDER: ICD-10-CM

## 2017-08-25 DIAGNOSIS — F43.20 ADJUSTMENT DISORDER, UNSPECIFIED: ICD-10-CM

## 2017-08-25 DIAGNOSIS — F19.94 OTHER PSYCHOACTIVE SUBSTANCE USE, UNSPECIFIED WITH PSYCHOACTIVE SUBSTANCE-INDUCED MOOD DISORDER: ICD-10-CM

## 2017-08-25 LAB
ALBUMIN SERPL ELPH-MCNC: 3.8 G/DL — SIGNIFICANT CHANGE UP (ref 3.3–5)
ALP SERPL-CCNC: 53 U/L — SIGNIFICANT CHANGE UP (ref 40–120)
ALT FLD-CCNC: 23 U/L — SIGNIFICANT CHANGE UP (ref 4–41)
AMPHET UR-MCNC: POSITIVE — SIGNIFICANT CHANGE UP
APAP SERPL-MCNC: < 15 UG/ML — LOW (ref 15–25)
APPEARANCE UR: CLEAR — SIGNIFICANT CHANGE UP
AST SERPL-CCNC: 25 U/L — SIGNIFICANT CHANGE UP (ref 4–40)
BARBITURATES MEASUREMENT: NEGATIVE — SIGNIFICANT CHANGE UP
BARBITURATES UR SCN-MCNC: NEGATIVE — SIGNIFICANT CHANGE UP
BASOPHILS # BLD AUTO: 0.02 K/UL — SIGNIFICANT CHANGE UP (ref 0–0.2)
BASOPHILS NFR BLD AUTO: 0.2 % — SIGNIFICANT CHANGE UP (ref 0–2)
BENZODIAZ SERPL-MCNC: POSITIVE — SIGNIFICANT CHANGE UP
BENZODIAZ UR-MCNC: POSITIVE — SIGNIFICANT CHANGE UP
BILIRUB SERPL-MCNC: 0.3 MG/DL — SIGNIFICANT CHANGE UP (ref 0.2–1.2)
BILIRUB UR-MCNC: NEGATIVE — SIGNIFICANT CHANGE UP
BLOOD UR QL VISUAL: NEGATIVE — SIGNIFICANT CHANGE UP
BUN SERPL-MCNC: 21 MG/DL — SIGNIFICANT CHANGE UP (ref 7–23)
CALCIUM SERPL-MCNC: 9 MG/DL — SIGNIFICANT CHANGE UP (ref 8.4–10.5)
CANNABINOIDS UR-MCNC: NEGATIVE — SIGNIFICANT CHANGE UP
CHLORIDE SERPL-SCNC: 99 MMOL/L — SIGNIFICANT CHANGE UP (ref 98–107)
CO2 SERPL-SCNC: 30 MMOL/L — SIGNIFICANT CHANGE UP (ref 22–31)
COCAINE METAB.OTHER UR-MCNC: NEGATIVE — SIGNIFICANT CHANGE UP
COLOR SPEC: SIGNIFICANT CHANGE UP
CREAT SERPL-MCNC: 0.75 MG/DL — SIGNIFICANT CHANGE UP (ref 0.5–1.3)
EOSINOPHIL # BLD AUTO: 0.15 K/UL — SIGNIFICANT CHANGE UP (ref 0–0.5)
EOSINOPHIL NFR BLD AUTO: 1.2 % — SIGNIFICANT CHANGE UP (ref 0–6)
ETHANOL BLD-MCNC: < 10 MG/DL — SIGNIFICANT CHANGE UP
GLUCOSE SERPL-MCNC: 95 MG/DL — SIGNIFICANT CHANGE UP (ref 70–99)
GLUCOSE UR-MCNC: NEGATIVE — SIGNIFICANT CHANGE UP
HCT VFR BLD CALC: 38.9 % — LOW (ref 39–50)
HGB BLD-MCNC: 13.3 G/DL — SIGNIFICANT CHANGE UP (ref 13–17)
IMM GRANULOCYTES # BLD AUTO: 0.03 # — SIGNIFICANT CHANGE UP
IMM GRANULOCYTES NFR BLD AUTO: 0.2 % — SIGNIFICANT CHANGE UP (ref 0–1.5)
KETONES UR-MCNC: NEGATIVE — SIGNIFICANT CHANGE UP
LEUKOCYTE ESTERASE UR-ACNC: NEGATIVE — SIGNIFICANT CHANGE UP
LYMPHOCYTES # BLD AUTO: 18.7 % — SIGNIFICANT CHANGE UP (ref 13–44)
LYMPHOCYTES # BLD AUTO: 2.39 K/UL — SIGNIFICANT CHANGE UP (ref 1–3.3)
MCHC RBC-ENTMCNC: 30.7 PG — SIGNIFICANT CHANGE UP (ref 27–34)
MCHC RBC-ENTMCNC: 34.2 % — SIGNIFICANT CHANGE UP (ref 32–36)
MCV RBC AUTO: 89.8 FL — SIGNIFICANT CHANGE UP (ref 80–100)
METHADONE UR-MCNC: NEGATIVE — SIGNIFICANT CHANGE UP
MONOCYTES # BLD AUTO: 0.98 K/UL — HIGH (ref 0–0.9)
MONOCYTES NFR BLD AUTO: 7.7 % — SIGNIFICANT CHANGE UP (ref 2–14)
MUCOUS THREADS # UR AUTO: SIGNIFICANT CHANGE UP
NEUTROPHILS # BLD AUTO: 9.22 K/UL — HIGH (ref 1.8–7.4)
NEUTROPHILS NFR BLD AUTO: 72 % — SIGNIFICANT CHANGE UP (ref 43–77)
NITRITE UR-MCNC: NEGATIVE — SIGNIFICANT CHANGE UP
NRBC # FLD: 0 — SIGNIFICANT CHANGE UP
OPIATES UR-MCNC: NEGATIVE — SIGNIFICANT CHANGE UP
OXYCODONE UR-MCNC: NEGATIVE — SIGNIFICANT CHANGE UP
PCP UR-MCNC: NEGATIVE — SIGNIFICANT CHANGE UP
PH UR: 6.5 — SIGNIFICANT CHANGE UP (ref 4.6–8)
PLATELET # BLD AUTO: 222 K/UL — SIGNIFICANT CHANGE UP (ref 150–400)
PMV BLD: 9 FL — SIGNIFICANT CHANGE UP (ref 7–13)
POTASSIUM SERPL-MCNC: 3.9 MMOL/L — SIGNIFICANT CHANGE UP (ref 3.5–5.3)
POTASSIUM SERPL-SCNC: 3.9 MMOL/L — SIGNIFICANT CHANGE UP (ref 3.5–5.3)
PROT SERPL-MCNC: 5.9 G/DL — LOW (ref 6–8.3)
PROT UR-MCNC: NEGATIVE — SIGNIFICANT CHANGE UP
RBC # BLD: 4.33 M/UL — SIGNIFICANT CHANGE UP (ref 4.2–5.8)
RBC # FLD: 13.5 % — SIGNIFICANT CHANGE UP (ref 10.3–14.5)
RBC CASTS # UR COMP ASSIST: SIGNIFICANT CHANGE UP (ref 0–?)
SALICYLATES SERPL-MCNC: < 5 MG/DL — LOW (ref 15–30)
SODIUM SERPL-SCNC: 140 MMOL/L — SIGNIFICANT CHANGE UP (ref 135–145)
SP GR SPEC: 1.01 — SIGNIFICANT CHANGE UP (ref 1–1.03)
TSH SERPL-MCNC: 2.65 UIU/ML — SIGNIFICANT CHANGE UP (ref 0.27–4.2)
UROBILINOGEN FLD QL: NORMAL E.U. — SIGNIFICANT CHANGE UP (ref 0.1–0.2)
WBC # BLD: 12.79 K/UL — HIGH (ref 3.8–10.5)
WBC # FLD AUTO: 12.79 K/UL — HIGH (ref 3.8–10.5)
WBC UR QL: SIGNIFICANT CHANGE UP (ref 0–?)

## 2017-08-25 PROCEDURE — 99285 EMERGENCY DEPT VISIT HI MDM: CPT

## 2017-08-25 PROCEDURE — 99221 1ST HOSP IP/OBS SF/LOW 40: CPT

## 2017-08-25 RX ORDER — DIPHENHYDRAMINE HCL 50 MG
50 CAPSULE ORAL EVERY 6 HOURS
Qty: 0 | Refills: 0 | Status: DISCONTINUED | OUTPATIENT
Start: 2017-08-25 | End: 2017-08-31

## 2017-08-25 RX ORDER — BUPROPION HYDROCHLORIDE 150 MG/1
1 TABLET, EXTENDED RELEASE ORAL
Qty: 0 | Refills: 0 | COMMUNITY

## 2017-08-25 RX ORDER — TRAZODONE HCL 50 MG
150 TABLET ORAL ONCE
Qty: 0 | Refills: 0 | Status: COMPLETED | OUTPATIENT
Start: 2017-08-25 | End: 2017-08-25

## 2017-08-25 RX ORDER — DIAZEPAM 5 MG
2 TABLET ORAL
Qty: 0 | Refills: 0 | COMMUNITY

## 2017-08-25 RX ORDER — HALOPERIDOL DECANOATE 100 MG/ML
5 INJECTION INTRAMUSCULAR EVERY 6 HOURS
Qty: 0 | Refills: 0 | Status: DISCONTINUED | OUTPATIENT
Start: 2017-08-25 | End: 2017-08-31

## 2017-08-25 RX ORDER — TRAZODONE HCL 50 MG
150 TABLET ORAL AT BEDTIME
Qty: 0 | Refills: 0 | Status: DISCONTINUED | OUTPATIENT
Start: 2017-08-25 | End: 2017-08-31

## 2017-08-25 RX ORDER — HALOPERIDOL DECANOATE 100 MG/ML
5 INJECTION INTRAMUSCULAR ONCE
Qty: 0 | Refills: 0 | Status: DISCONTINUED | OUTPATIENT
Start: 2017-08-25 | End: 2017-08-31

## 2017-08-25 RX ORDER — DIPHENHYDRAMINE HCL 50 MG
50 CAPSULE ORAL ONCE
Qty: 0 | Refills: 0 | Status: DISCONTINUED | OUTPATIENT
Start: 2017-08-25 | End: 2017-08-31

## 2017-08-25 RX ORDER — TRAZODONE HCL 50 MG
150 TABLET ORAL ONCE
Qty: 0 | Refills: 0 | Status: DISCONTINUED | OUTPATIENT
Start: 2017-08-25 | End: 2017-08-25

## 2017-08-25 RX ORDER — DULOXETINE HYDROCHLORIDE 30 MG/1
30 CAPSULE, DELAYED RELEASE ORAL DAILY
Qty: 0 | Refills: 0 | Status: DISCONTINUED | OUTPATIENT
Start: 2017-08-25 | End: 2017-08-31

## 2017-08-25 RX ADMIN — Medication 45 MILLIGRAM(S): at 19:10

## 2017-08-25 RX ADMIN — Medication 150 MILLIGRAM(S): at 22:04

## 2017-08-25 RX ADMIN — Medication 45 MILLIGRAM(S): at 08:29

## 2017-08-25 RX ADMIN — DULOXETINE HYDROCHLORIDE 30 MILLIGRAM(S): 30 CAPSULE, DELAYED RELEASE ORAL at 17:10

## 2017-08-25 RX ADMIN — Medication 150 MILLIGRAM(S): at 03:13
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
## 2017-08-25 NOTE — ED PROVIDER NOTE - PMH
Anxiety    BPH (benign prostatic hypertrophy)    Brain bleed    HTN (hypertension)    PTSD (post-traumatic stress disorder)    Renal failure  for 1 day in 2004 secomndary to lithium  Urinary retention

## 2017-08-25 NOTE — ED ADULT NURSE REASSESSMENT NOTE - NS ED NURSE REASSESS COMMENT FT1
Received report from night RN SS pt lying on stretcher in nad denies Si/Hi/AVh at present calm & cooperative, eval on going.
Pt received sleeping .

## 2017-08-25 NOTE — ED BEHAVIORAL HEALTH ASSESSMENT NOTE - SUICIDE RISK FACTORS
Highly impulsive behavior/Mood episode/Access to means (pills, firearms, etc.)/Substance abuse/dependence/History of abuse/trauma

## 2017-08-25 NOTE — ED ADULT NURSE NOTE - PRO INTERPRETER NEED 2
[address]   [facility] [address]  Phone: [phone]  April 6, 2017      Shanelle Blum  [address] [place] [address]      Dear Shanelle,    We care about your health and have reviewed your health plan including your medical conditions, medications, and lab results.  Based on this review, it is recommended that you follow up regarding the following health topic(s):  -Colon Cancer Screening    We recommend you take the following action(s):  -schedule a COLONOSCOPY to look for colon cancer (due every 10 years or 5 years in higher risk situations.)  Colonoscopies can prevent 90-95% of colon cancer deaths.  Problem lesions can be removed before they ever become cancer.  If you do not wish to do a colonoscopy or cannot afford to do one at this time, there is another option called a Fecal Immunochemical Occult Blood Test (FIT) a take home stool sample kit.  It does not replace the colonoscopy for colorectal cancer screening, but it can detect hidden bleeding in the lower colon.  It does need to be repeated every year and if a positive result is obtained, you would be referred for a colonoscopy.  If you have completed either one of these tests at another facility, please have the records sent to our clinic for our records.     Please call us at the [facility] - [phone] (or use YongChe) to address the above recommendations.     Thank you for trusting [facility] and we appreciate the opportunity to serve you.  We look forward to supporting your healthcare needs in the future.    Healthy Regards,    Your Health Care Team  [facility]  
English
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
## 2017-08-25 NOTE — ED PROVIDER NOTE - CARE PLAN
Principal Discharge DX:	PTSD (post-traumatic stress disorder) Principal Discharge DX:	Hallucination Principal Discharge DX:	Adjustment disorder  Secondary Diagnosis:	Substance induced mood disorder  Secondary Diagnosis:	Hallucination
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
## 2017-08-25 NOTE — ED BEHAVIORAL HEALTH ASSESSMENT NOTE - DESCRIPTION
calm, resting hernia lives with  of 3-4 years and several tenants, likes to be referred to as Reverend, received MPH from [place]

## 2017-08-25 NOTE — ED BEHAVIORAL HEALTH ASSESSMENT NOTE - SUICIDE PROTECTIVE FACTORS
Responsibility to family and others/Supportive social network or family/Identifies reasons for living/Future oriented/Positive therapeutic relationships

## 2017-08-25 NOTE — ED PROVIDER NOTE - NS ED ROS FT
Denies chest pain, SOB, N/V/D and fevers, Denies palpitations or diaphoresis. Denies Numbness, Tingling, Blurry Vision and HA.  Denies suicidal/homicidal thoughts. Denies visual/auditory hallucinations.  Denies recent falls, trauma and injuries. Denies pain or any other medical complaints.
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
## 2017-08-26 PROCEDURE — 99232 SBSQ HOSP IP/OBS MODERATE 35: CPT

## 2017-08-26 RX ADMIN — Medication 45 MILLIGRAM(S): at 09:15

## 2017-08-26 RX ADMIN — Medication 45 MILLIGRAM(S): at 22:01

## 2017-08-26 RX ADMIN — DULOXETINE HYDROCHLORIDE 30 MILLIGRAM(S): 30 CAPSULE, DELAYED RELEASE ORAL at 09:15

## 2017-08-26 RX ADMIN — Medication 150 MILLIGRAM(S): at 23:10

## 2017-08-27 PROCEDURE — 99232 SBSQ HOSP IP/OBS MODERATE 35: CPT

## 2017-08-27 RX ADMIN — Medication 150 MILLIGRAM(S): at 22:00

## 2017-08-27 RX ADMIN — Medication 45 MILLIGRAM(S): at 22:00

## 2017-08-27 RX ADMIN — Medication 45 MILLIGRAM(S): at 09:02

## 2017-08-27 RX ADMIN — DULOXETINE HYDROCHLORIDE 30 MILLIGRAM(S): 30 CAPSULE, DELAYED RELEASE ORAL at 09:02

## 2017-08-28 PROCEDURE — 99232 SBSQ HOSP IP/OBS MODERATE 35: CPT | Mod: 25

## 2017-08-28 PROCEDURE — 90853 GROUP PSYCHOTHERAPY: CPT

## 2017-08-28 RX ADMIN — Medication 45 MILLIGRAM(S): at 09:02

## 2017-08-28 RX ADMIN — Medication 150 MILLIGRAM(S): at 23:23

## 2017-08-28 RX ADMIN — DULOXETINE HYDROCHLORIDE 30 MILLIGRAM(S): 30 CAPSULE, DELAYED RELEASE ORAL at 09:02

## 2017-08-28 RX ADMIN — Medication 45 MILLIGRAM(S): at 20:26

## 2017-08-29 PROCEDURE — 99232 SBSQ HOSP IP/OBS MODERATE 35: CPT

## 2017-08-29 RX ADMIN — Medication 45 MILLIGRAM(S): at 20:53

## 2017-08-29 RX ADMIN — Medication 45 MILLIGRAM(S): at 08:35

## 2017-08-29 RX ADMIN — DULOXETINE HYDROCHLORIDE 30 MILLIGRAM(S): 30 CAPSULE, DELAYED RELEASE ORAL at 08:35

## 2017-08-30 PROCEDURE — 99232 SBSQ HOSP IP/OBS MODERATE 35: CPT

## 2017-08-30 RX ORDER — DIAZEPAM 5 MG
1 TABLET ORAL
Qty: 60 | Refills: 0 | OUTPATIENT
Start: 2017-08-30 | End: 2017-09-14

## 2017-08-30 RX ORDER — DIAZEPAM 5 MG
1 TABLET ORAL
Qty: 60 | Refills: 0
Start: 2017-08-30 | End: 2017-09-14

## 2017-08-30 RX ORDER — TAMSULOSIN HYDROCHLORIDE 0.4 MG/1
1 CAPSULE ORAL
Qty: 0 | Refills: 0 | COMMUNITY

## 2017-08-30 RX ORDER — ACETAMINOPHEN 500 MG
2 TABLET ORAL
Qty: 0 | Refills: 0 | COMMUNITY

## 2017-08-30 RX ADMIN — Medication 45 MILLIGRAM(S): at 21:34

## 2017-08-30 RX ADMIN — DULOXETINE HYDROCHLORIDE 30 MILLIGRAM(S): 30 CAPSULE, DELAYED RELEASE ORAL at 08:37

## 2017-08-30 RX ADMIN — Medication 150 MILLIGRAM(S): at 21:34

## 2017-08-30 RX ADMIN — Medication 45 MILLIGRAM(S): at 08:37

## 2017-08-30 RX ADMIN — Medication 150 MILLIGRAM(S): at 00:05

## 2017-08-31 VITALS — HEART RATE: 66 BPM | TEMPERATURE: 98 F | SYSTOLIC BLOOD PRESSURE: 108 MMHG | DIASTOLIC BLOOD PRESSURE: 61 MMHG

## 2017-08-31 PROCEDURE — 99238 HOSP IP/OBS DSCHRG MGMT 30/<: CPT

## 2017-08-31 RX ORDER — ACETAMINOPHEN 500 MG
650 TABLET ORAL ONCE
Qty: 0 | Refills: 0 | Status: COMPLETED | OUTPATIENT
Start: 2017-08-31 | End: 2017-08-31

## 2017-08-31 RX ORDER — BENZOCAINE AND MENTHOL 5; 1 G/100ML; G/100ML
1 LIQUID ORAL EVERY 6 HOURS
Qty: 0 | Refills: 0 | Status: DISCONTINUED | OUTPATIENT
Start: 2017-08-31 | End: 2017-08-31

## 2017-08-31 RX ADMIN — Medication 650 MILLIGRAM(S): at 03:00

## 2017-08-31 RX ADMIN — BENZOCAINE AND MENTHOL 1 LOZENGE: 5; 1 LIQUID ORAL at 03:00

## 2017-08-31 RX ADMIN — Medication 650 MILLIGRAM(S): at 03:30

## 2017-08-31 RX ADMIN — DULOXETINE HYDROCHLORIDE 30 MILLIGRAM(S): 30 CAPSULE, DELAYED RELEASE ORAL at 09:07

## 2017-08-31 RX ADMIN — Medication 45 MILLIGRAM(S): at 09:07

## 2017-10-05 ENCOUNTER — APPOINTMENT (OUTPATIENT)
Dept: UROLOGY | Facility: CLINIC | Age: 62
End: 2017-10-05
Payer: MEDICARE

## 2017-10-05 VITALS
OXYGEN SATURATION: 98 % | DIASTOLIC BLOOD PRESSURE: 70 MMHG | WEIGHT: 147 LBS | HEIGHT: 69 IN | HEART RATE: 71 BPM | BODY MASS INDEX: 21.77 KG/M2 | SYSTOLIC BLOOD PRESSURE: 122 MMHG

## 2017-10-05 DIAGNOSIS — N13.8 BENIGN PROSTATIC HYPERPLASIA WITH LOWER URINARY TRACT SYMPMS: ICD-10-CM

## 2017-10-05 DIAGNOSIS — Z09 ENCOUNTER FOR FOLLOW-UP EXAMINATION AFTER COMPLETED TREATMENT FOR CONDITIONS OTHER THAN MALIGNANT NEOPLASM: ICD-10-CM

## 2017-10-05 DIAGNOSIS — Z87.19 PERSONAL HISTORY OF OTHER DISEASES OF THE DIGESTIVE SYSTEM: ICD-10-CM

## 2017-10-05 DIAGNOSIS — N40.1 BENIGN PROSTATIC HYPERPLASIA WITH LOWER URINARY TRACT SYMPMS: ICD-10-CM

## 2017-10-05 DIAGNOSIS — K46.9 UNSPECIFIED ABDOMINAL HERNIA W/OUT OBSTRUCTION OR GANGRENE: ICD-10-CM

## 2017-10-05 DIAGNOSIS — F43.10 POST-TRAUMATIC STRESS DISORDER, UNSPECIFIED: ICD-10-CM

## 2017-10-05 PROCEDURE — 99213 OFFICE O/P EST LOW 20 MIN: CPT

## 2017-10-05 RX ORDER — CLONAZEPAM 2 MG/1
2 TABLET ORAL
Qty: 150 | Refills: 0 | Status: DISCONTINUED | COMMUNITY
Start: 2017-02-03 | End: 2017-10-05

## 2017-10-05 RX ORDER — TAMSULOSIN HYDROCHLORIDE 0.4 MG/1
0.4 CAPSULE ORAL
Qty: 30 | Refills: 11 | Status: DISCONTINUED | COMMUNITY
Start: 2017-01-31 | End: 2017-10-05

## 2017-10-05 RX ORDER — BUPROPION HYDROCHLORIDE 300 MG/1
300 TABLET, EXTENDED RELEASE ORAL
Refills: 0 | Status: DISCONTINUED | COMMUNITY
End: 2017-10-05

## 2017-10-05 RX ORDER — TAMSULOSIN HYDROCHLORIDE 0.4 MG/1
0.4 CAPSULE ORAL
Refills: 0 | Status: DISCONTINUED | COMMUNITY
End: 2017-10-05

## 2017-10-05 RX ORDER — OXYCODONE AND ACETAMINOPHEN 5; 325 MG/1; MG/1
5-325 TABLET ORAL
Qty: 4 | Refills: 0 | Status: DISCONTINUED | COMMUNITY
Start: 2017-06-26 | End: 2017-10-05

## 2017-10-06 ENCOUNTER — APPOINTMENT (OUTPATIENT)
Dept: UROLOGY | Facility: CLINIC | Age: 62
End: 2017-10-06

## 2017-10-20 ENCOUNTER — MEDICATION RENEWAL (OUTPATIENT)
Age: 62
End: 2017-10-20

## 2017-10-20 DIAGNOSIS — N52.9 MALE ERECTILE DYSFUNCTION, UNSPECIFIED: ICD-10-CM

## 2017-10-27 PROBLEM — N52.9 ORGANIC IMPOTENCE: Status: ACTIVE | Noted: 2017-10-05

## 2017-10-27 RX ORDER — SILDENAFIL 20 MG/1
20 TABLET ORAL
Qty: 90 | Refills: 3 | Status: DISCONTINUED | COMMUNITY
Start: 2017-10-05 | End: 2017-10-27

## 2017-10-27 RX ORDER — SILDENAFIL CITRATE 100 MG/1
100 TABLET, FILM COATED ORAL
Qty: 18 | Refills: 3 | Status: ACTIVE | COMMUNITY
Start: 2017-10-27 | End: 1900-01-01

## 2017-11-18 ENCOUNTER — EMERGENCY (EMERGENCY)
Facility: HOSPITAL | Age: 62
LOS: 1 days | Discharge: HOME | End: 2017-11-18
Attending: EMERGENCY MEDICINE | Admitting: EMERGENCY MEDICINE
Payer: MEDICARE

## 2017-11-18 VITALS
SYSTOLIC BLOOD PRESSURE: 129 MMHG | DIASTOLIC BLOOD PRESSURE: 76 MMHG | RESPIRATION RATE: 16 BRPM | HEART RATE: 64 BPM | OXYGEN SATURATION: 97 % | TEMPERATURE: 98 F

## 2017-11-18 VITALS
OXYGEN SATURATION: 98 % | HEART RATE: 70 BPM | DIASTOLIC BLOOD PRESSURE: 74 MMHG | TEMPERATURE: 98 F | RESPIRATION RATE: 16 BRPM | SYSTOLIC BLOOD PRESSURE: 121 MMHG

## 2017-11-18 DIAGNOSIS — Z98.890 OTHER SPECIFIED POSTPROCEDURAL STATES: Chronic | ICD-10-CM

## 2017-11-18 DIAGNOSIS — Z90.49 ACQUIRED ABSENCE OF OTHER SPECIFIED PARTS OF DIGESTIVE TRACT: Chronic | ICD-10-CM

## 2017-11-18 LAB
ALBUMIN SERPL ELPH-MCNC: 3.7 G/DL — SIGNIFICANT CHANGE UP (ref 3.3–5)
ALP SERPL-CCNC: 47 U/L — SIGNIFICANT CHANGE UP (ref 40–120)
ALT FLD-CCNC: 18 U/L RC — SIGNIFICANT CHANGE UP (ref 10–45)
ANION GAP SERPL CALC-SCNC: 11 MMOL/L — SIGNIFICANT CHANGE UP (ref 5–17)
APTT BLD: 28.1 SEC — SIGNIFICANT CHANGE UP (ref 27.5–37.4)
AST SERPL-CCNC: 22 U/L — SIGNIFICANT CHANGE UP (ref 10–40)
BASE EXCESS BLDV CALC-SCNC: 2 MMOL/L — SIGNIFICANT CHANGE UP (ref -2–2)
BASOPHILS # BLD AUTO: 0 K/UL — SIGNIFICANT CHANGE UP (ref 0–0.2)
BASOPHILS NFR BLD AUTO: 0.6 % — SIGNIFICANT CHANGE UP (ref 0–2)
BILIRUB SERPL-MCNC: 0.2 MG/DL — SIGNIFICANT CHANGE UP (ref 0.2–1.2)
BUN SERPL-MCNC: 21 MG/DL — SIGNIFICANT CHANGE UP (ref 7–23)
CA-I SERPL-SCNC: 1.25 MMOL/L — SIGNIFICANT CHANGE UP (ref 1.12–1.3)
CALCIUM SERPL-MCNC: 8.9 MG/DL — SIGNIFICANT CHANGE UP (ref 8.4–10.5)
CHLORIDE BLDV-SCNC: 107 MMOL/L — SIGNIFICANT CHANGE UP (ref 96–108)
CHLORIDE SERPL-SCNC: 106 MMOL/L — SIGNIFICANT CHANGE UP (ref 96–108)
CO2 BLDV-SCNC: 29 MMOL/L — SIGNIFICANT CHANGE UP (ref 22–30)
CO2 SERPL-SCNC: 23 MMOL/L — SIGNIFICANT CHANGE UP (ref 22–31)
CREAT SERPL-MCNC: 0.83 MG/DL — SIGNIFICANT CHANGE UP (ref 0.5–1.3)
EOSINOPHIL # BLD AUTO: 0.1 K/UL — SIGNIFICANT CHANGE UP (ref 0–0.5)
EOSINOPHIL NFR BLD AUTO: 1 % — SIGNIFICANT CHANGE UP (ref 0–6)
GAS PNL BLDV: 139 MMOL/L — SIGNIFICANT CHANGE UP (ref 136–145)
GAS PNL BLDV: SIGNIFICANT CHANGE UP
GAS PNL BLDV: SIGNIFICANT CHANGE UP
GLUCOSE BLDV-MCNC: 99 MG/DL — SIGNIFICANT CHANGE UP (ref 70–99)
GLUCOSE SERPL-MCNC: 99 MG/DL — SIGNIFICANT CHANGE UP (ref 70–99)
HCO3 BLDV-SCNC: 27 MMOL/L — SIGNIFICANT CHANGE UP (ref 21–29)
HCT VFR BLD CALC: 40.8 % — SIGNIFICANT CHANGE UP (ref 39–50)
HCT VFR BLDA CALC: 42 % — SIGNIFICANT CHANGE UP (ref 39–50)
HGB BLD CALC-MCNC: 13.8 G/DL — SIGNIFICANT CHANGE UP (ref 13–17)
HGB BLD-MCNC: 13.9 G/DL — SIGNIFICANT CHANGE UP (ref 13–17)
INR BLD: 0.87 RATIO — LOW (ref 0.88–1.16)
LACTATE BLDV-MCNC: 1.1 MMOL/L — SIGNIFICANT CHANGE UP (ref 0.7–2)
LIDOCAIN IGE QN: 100 U/L — HIGH (ref 7–60)
LYMPHOCYTES # BLD AUTO: 1.5 K/UL — SIGNIFICANT CHANGE UP (ref 1–3.3)
LYMPHOCYTES # BLD AUTO: 17.1 % — SIGNIFICANT CHANGE UP (ref 13–44)
MCHC RBC-ENTMCNC: 33.5 PG — SIGNIFICANT CHANGE UP (ref 27–34)
MCHC RBC-ENTMCNC: 34.1 GM/DL — SIGNIFICANT CHANGE UP (ref 32–36)
MCV RBC AUTO: 98.3 FL — SIGNIFICANT CHANGE UP (ref 80–100)
MONOCYTES # BLD AUTO: 0.7 K/UL — SIGNIFICANT CHANGE UP (ref 0–0.9)
MONOCYTES NFR BLD AUTO: 7.7 % — SIGNIFICANT CHANGE UP (ref 2–14)
NEUTROPHILS # BLD AUTO: 6.4 K/UL — SIGNIFICANT CHANGE UP (ref 1.8–7.4)
NEUTROPHILS NFR BLD AUTO: 73.6 % — SIGNIFICANT CHANGE UP (ref 43–77)
PCO2 BLDV: 47 MMHG — SIGNIFICANT CHANGE UP (ref 35–50)
PH BLDV: 7.38 — SIGNIFICANT CHANGE UP (ref 7.35–7.45)
PLATELET # BLD AUTO: 220 K/UL — SIGNIFICANT CHANGE UP (ref 150–400)
PO2 BLDV: 46 MMHG — HIGH (ref 25–45)
POTASSIUM BLDV-SCNC: 4 MMOL/L — SIGNIFICANT CHANGE UP (ref 3.5–5)
POTASSIUM SERPL-MCNC: 4.3 MMOL/L — SIGNIFICANT CHANGE UP (ref 3.5–5.3)
POTASSIUM SERPL-SCNC: 4.3 MMOL/L — SIGNIFICANT CHANGE UP (ref 3.5–5.3)
PROT SERPL-MCNC: 6.3 G/DL — SIGNIFICANT CHANGE UP (ref 6–8.3)
PROTHROM AB SERPL-ACNC: 9.5 SEC — LOW (ref 9.8–12.7)
RBC # BLD: 4.15 M/UL — LOW (ref 4.2–5.8)
RBC # FLD: 12.8 % — SIGNIFICANT CHANGE UP (ref 10.3–14.5)
SAO2 % BLDV: 82 % — SIGNIFICANT CHANGE UP (ref 67–88)
SODIUM SERPL-SCNC: 140 MMOL/L — SIGNIFICANT CHANGE UP (ref 135–145)
WBC # BLD: 8.7 K/UL — SIGNIFICANT CHANGE UP (ref 3.8–10.5)
WBC # FLD AUTO: 8.7 K/UL — SIGNIFICANT CHANGE UP (ref 3.8–10.5)

## 2017-11-18 PROCEDURE — 71250 CT THORAX DX C-: CPT | Mod: 26

## 2017-11-18 PROCEDURE — 82435 ASSAY OF BLOOD CHLORIDE: CPT

## 2017-11-18 PROCEDURE — 83690 ASSAY OF LIPASE: CPT

## 2017-11-18 PROCEDURE — 85027 COMPLETE CBC AUTOMATED: CPT

## 2017-11-18 PROCEDURE — 80053 COMPREHEN METABOLIC PANEL: CPT

## 2017-11-18 PROCEDURE — 99284 EMERGENCY DEPT VISIT MOD MDM: CPT | Mod: 25

## 2017-11-18 PROCEDURE — 84295 ASSAY OF SERUM SODIUM: CPT

## 2017-11-18 PROCEDURE — 84132 ASSAY OF SERUM POTASSIUM: CPT

## 2017-11-18 PROCEDURE — 82330 ASSAY OF CALCIUM: CPT

## 2017-11-18 PROCEDURE — 85610 PROTHROMBIN TIME: CPT

## 2017-11-18 PROCEDURE — 85730 THROMBOPLASTIN TIME PARTIAL: CPT

## 2017-11-18 PROCEDURE — 82803 BLOOD GASES ANY COMBINATION: CPT

## 2017-11-18 PROCEDURE — 71250 CT THORAX DX C-: CPT

## 2017-11-18 PROCEDURE — 85014 HEMATOCRIT: CPT

## 2017-11-18 PROCEDURE — 83605 ASSAY OF LACTIC ACID: CPT

## 2017-11-18 PROCEDURE — 99284 EMERGENCY DEPT VISIT MOD MDM: CPT

## 2017-11-18 PROCEDURE — 82947 ASSAY GLUCOSE BLOOD QUANT: CPT

## 2017-11-18 RX ORDER — IBUPROFEN 200 MG
600 TABLET ORAL ONCE
Qty: 0 | Refills: 0 | Status: COMPLETED | OUTPATIENT
Start: 2017-11-18 | End: 2017-11-18

## 2017-11-18 RX ORDER — LIDOCAINE 4 G/100G
1 CREAM TOPICAL
Qty: 1 | Refills: 0
Start: 2017-11-18 | End: 2017-12-03

## 2017-11-18 RX ORDER — OXYCODONE HYDROCHLORIDE 5 MG/1
5 TABLET ORAL ONCE
Qty: 0 | Refills: 0 | Status: DISCONTINUED | OUTPATIENT
Start: 2017-11-18 | End: 2017-11-18

## 2017-11-18 RX ORDER — DOCUSATE SODIUM 100 MG
100 CAPSULE ORAL ONCE
Qty: 0 | Refills: 0 | Status: COMPLETED | OUTPATIENT
Start: 2017-11-18 | End: 2017-11-18

## 2017-11-18 RX ADMIN — OXYCODONE HYDROCHLORIDE 5 MILLIGRAM(S): 5 TABLET ORAL at 17:49

## 2017-11-18 RX ADMIN — Medication 100 MILLIGRAM(S): at 17:50

## 2017-11-18 RX ADMIN — Medication 600 MILLIGRAM(S): at 14:32

## 2017-11-18 RX ADMIN — OXYCODONE HYDROCHLORIDE 5 MILLIGRAM(S): 5 TABLET ORAL at 14:32

## 2017-11-18 NOTE — ED PROVIDER NOTE - MEDICAL DECISION MAKING DETAILS
Fall off roof 5f ago with persisent pain ro fx rib/pulmonary contusion, check labs ct analagesia  Glenn Lorenzo MD, Facep

## 2017-11-18 NOTE — ED PROVIDER NOTE - ATTENDING CONTRIBUTION TO CARE
Private Physician Lauro Maher MD [facility]  62y male employed as  at [employer], P[facility] PTSD  No habits, pt comes to ed complains of fall off roof 5d ago. 12 feet hit stairs. Pain rt chest worse with turning walking. coughing/laughing. No loc, nvdc. Ha,neck pain. No anoxrexia. PE adult male awake alert mild distress from rt chest pain. NCAT chest +ttp over rt lat inferior chest,. no crepitus, abd soft +bs neuro gcs 15 speech fluent moves all extr cv no rubs, gallops or murmurs  Glenn Lorenzo MD, Facep

## 2017-11-18 NOTE — ED PROVIDER NOTE - PHYSICAL EXAMINATION
NAD, VSS, Afebrile, No facial or scalp tender, No spinal tender, + right mid/ low rib tender, anteriorly, no swelling or lesion, ABD soft, No CVA tender, Neuro- intact.

## 2017-11-18 NOTE — ED PROVIDER NOTE - SHIFT CHANGE DETAILS
Attending MD Rueda: 62M s/p fall from roof a week ago, here for persistent R chest pain.  CT chest completed, pending read, if negative can be discharged home with IS and analgesia, if more than one fx, surgery trauma consult.

## 2017-11-18 NOTE — ED PROVIDER NOTE - PROGRESS NOTE DETAILS
Inspirometer's given with well demo. Informed CT result including a lung nodule and will f/u with PMD for reevaluation. Attending MD Rueda: Patient re-evaluated and requesting his home Valium and Oxycodone 5mg (name and dose) here.  Explained will give now but none for discharge as no acute findings and incident 5 days ago, if feels need stronger can follow up with PMD.  Reports his niece is Cardiothoracic surgeon and good friend is a pain management doctor.  Explained he is free to ask for their recommendations but mine is for a lidocaine patch and OTC pain medication.  No acute issues at  this time.  Lab and radiology tests reviewed with patient including incidental lung nodule.  Patient stable for discharge. Ambulating with steady gait. Instructed to continue to use Incentive Spirometer. Follow up instructions given, importance of follow up emphasized, return to ED parameters reviewed and patient verbalized understanding.  All questions answered, all concerns addressed. Attending MD Rueda: Patient re-evaluated and requesting his home Valium and Oxycodone 5mg (name and dose) here.  Explained will give now but none for discharge as no acute findings and incident 5 days ago, if feels need stronger can follow up with PMD.  Reports his niece is Cardiothoracic surgeon and good friend is a pain management doctor.  Explained he is free to ask for their recommendations but mine is for a lidocaine patch and OTC pain medication.  No acute issues at  this time.  Lab and radiology tests reviewed with patient including incidental lung nodule, lipase elevated.  Patient stable for discharge. Ambulating with steady gait. Instructed to continue to use Incentive Spirometer. Follow up instructions given, importance of follow up emphasized, return to ED parameters reviewed and patient verbalized understanding.  All questions answered, all concerns addressed. Informed CT result including a lung nodule and pt's aware of it and on f/u Pul.

## 2017-11-30 ENCOUNTER — MEDICATION RENEWAL (OUTPATIENT)
Age: 62
End: 2017-11-30

## 2017-11-30 RX ORDER — SILDENAFIL CITRATE 100 MG/1
100 TABLET, FILM COATED ORAL
Qty: 18 | Refills: 3 | Status: ACTIVE | COMMUNITY
Start: 2017-11-30 | End: 1900-01-01

## 2017-12-16 ENCOUNTER — APPOINTMENT (OUTPATIENT)
Dept: NUCLEAR MEDICINE | Facility: CLINIC | Age: 62
End: 2017-12-16

## 2017-12-16 ENCOUNTER — OUTPATIENT (OUTPATIENT)
Dept: OUTPATIENT SERVICES | Facility: HOSPITAL | Age: 62
LOS: 1 days | End: 2017-12-16
Payer: MEDICARE

## 2017-12-16 DIAGNOSIS — Z98.890 OTHER SPECIFIED POSTPROCEDURAL STATES: Chronic | ICD-10-CM

## 2017-12-16 DIAGNOSIS — Z00.8 ENCOUNTER FOR OTHER GENERAL EXAMINATION: ICD-10-CM

## 2017-12-16 DIAGNOSIS — Z90.49 ACQUIRED ABSENCE OF OTHER SPECIFIED PARTS OF DIGESTIVE TRACT: Chronic | ICD-10-CM

## 2017-12-16 PROCEDURE — A9552: CPT

## 2017-12-16 PROCEDURE — 78815 PET IMAGE W/CT SKULL-THIGH: CPT | Mod: 26,PI

## 2017-12-16 PROCEDURE — 78815 PET IMAGE W/CT SKULL-THIGH: CPT

## 2018-02-14 ENCOUNTER — OUTPATIENT (OUTPATIENT)
Dept: OUTPATIENT SERVICES | Facility: HOSPITAL | Age: 63
LOS: 1 days | Discharge: STILL ADMITTED OR ADMINISTRATIVE | End: 2018-02-14
Payer: MEDICARE

## 2018-02-14 DIAGNOSIS — Z98.890 OTHER SPECIFIED POSTPROCEDURAL STATES: Chronic | ICD-10-CM

## 2018-02-14 DIAGNOSIS — Z90.49 ACQUIRED ABSENCE OF OTHER SPECIFIED PARTS OF DIGESTIVE TRACT: Chronic | ICD-10-CM

## 2018-02-14 PROCEDURE — 90792 PSYCH DIAG EVAL W/MED SRVCS: CPT

## 2018-02-15 DIAGNOSIS — F43.10 POST-TRAUMATIC STRESS DISORDER, UNSPECIFIED: ICD-10-CM

## 2018-02-22 ENCOUNTER — OUTPATIENT (OUTPATIENT)
Dept: OUTPATIENT SERVICES | Facility: HOSPITAL | Age: 63
LOS: 1 days | Discharge: STILL ADMITTED OR ADMINISTRATIVE | End: 2018-02-22
Payer: MEDICARE

## 2018-02-22 DIAGNOSIS — Z90.49 ACQUIRED ABSENCE OF OTHER SPECIFIED PARTS OF DIGESTIVE TRACT: Chronic | ICD-10-CM

## 2018-02-22 DIAGNOSIS — Z98.890 OTHER SPECIFIED POSTPROCEDURAL STATES: Chronic | ICD-10-CM

## 2018-02-22 PROCEDURE — 90834 PSYTX W PT 45 MINUTES: CPT

## 2018-02-23 ENCOUNTER — OUTPATIENT (OUTPATIENT)
Dept: OUTPATIENT SERVICES | Facility: HOSPITAL | Age: 63
LOS: 1 days | Discharge: HOME | End: 2018-02-23
Payer: MEDICARE

## 2018-02-23 DIAGNOSIS — Z90.49 ACQUIRED ABSENCE OF OTHER SPECIFIED PARTS OF DIGESTIVE TRACT: Chronic | ICD-10-CM

## 2018-02-23 DIAGNOSIS — Z98.890 OTHER SPECIFIED POSTPROCEDURAL STATES: Chronic | ICD-10-CM

## 2018-02-26 DIAGNOSIS — F31.9 BIPOLAR DISORDER, UNSPECIFIED: ICD-10-CM

## 2018-02-27 DIAGNOSIS — F31.9 BIPOLAR DISORDER, UNSPECIFIED: ICD-10-CM

## 2018-02-28 ENCOUNTER — EMERGENCY (EMERGENCY)
Facility: HOSPITAL | Age: 63
LOS: 1 days | Discharge: HOME | End: 2018-02-28
Attending: EMERGENCY MEDICINE | Admitting: EMERGENCY MEDICINE
Payer: MEDICARE

## 2018-02-28 VITALS
TEMPERATURE: 98 F | HEART RATE: 62 BPM | RESPIRATION RATE: 20 BRPM | DIASTOLIC BLOOD PRESSURE: 93 MMHG | SYSTOLIC BLOOD PRESSURE: 160 MMHG

## 2018-02-28 DIAGNOSIS — Z98.890 OTHER SPECIFIED POSTPROCEDURAL STATES: Chronic | ICD-10-CM

## 2018-02-28 DIAGNOSIS — Z90.49 ACQUIRED ABSENCE OF OTHER SPECIFIED PARTS OF DIGESTIVE TRACT: Chronic | ICD-10-CM

## 2018-02-28 LAB
ALBUMIN SERPL ELPH-MCNC: 4.2 G/DL — SIGNIFICANT CHANGE UP (ref 3.3–5)
ALP SERPL-CCNC: 47 U/L — SIGNIFICANT CHANGE UP (ref 40–120)
ALT FLD-CCNC: 29 U/L — SIGNIFICANT CHANGE UP (ref 4–41)
APTT BLD: 28.5 SEC — SIGNIFICANT CHANGE UP (ref 27.5–37.4)
AST SERPL-CCNC: 32 U/L — SIGNIFICANT CHANGE UP (ref 4–40)
BASOPHILS # BLD AUTO: 0.03 K/UL — SIGNIFICANT CHANGE UP (ref 0–0.2)
BASOPHILS NFR BLD AUTO: 0.3 % — SIGNIFICANT CHANGE UP (ref 0–2)
BILIRUB SERPL-MCNC: 0.3 MG/DL — SIGNIFICANT CHANGE UP (ref 0.2–1.2)
BUN SERPL-MCNC: 13 MG/DL — SIGNIFICANT CHANGE UP (ref 7–23)
CALCIUM SERPL-MCNC: 8.6 MG/DL — SIGNIFICANT CHANGE UP (ref 8.4–10.5)
CHLORIDE SERPL-SCNC: 103 MMOL/L — SIGNIFICANT CHANGE UP (ref 98–107)
CK MB BLD-MCNC: 2.91 NG/ML — SIGNIFICANT CHANGE UP (ref 1–6.6)
CK MB BLD-MCNC: SIGNIFICANT CHANGE UP (ref 0–2.5)
CK SERPL-CCNC: 112 U/L — SIGNIFICANT CHANGE UP (ref 30–200)
CO2 SERPL-SCNC: 26 MMOL/L — SIGNIFICANT CHANGE UP (ref 22–31)
CREAT SERPL-MCNC: 0.76 MG/DL — SIGNIFICANT CHANGE UP (ref 0.5–1.3)
EOSINOPHIL # BLD AUTO: 0.1 K/UL — SIGNIFICANT CHANGE UP (ref 0–0.5)
EOSINOPHIL NFR BLD AUTO: 1.1 % — SIGNIFICANT CHANGE UP (ref 0–6)
GLUCOSE SERPL-MCNC: 81 MG/DL — SIGNIFICANT CHANGE UP (ref 70–99)
HCT VFR BLD CALC: 40.2 % — SIGNIFICANT CHANGE UP (ref 39–50)
HGB BLD-MCNC: 14 G/DL — SIGNIFICANT CHANGE UP (ref 13–17)
IMM GRANULOCYTES # BLD AUTO: 0.03 # — SIGNIFICANT CHANGE UP
IMM GRANULOCYTES NFR BLD AUTO: 0.3 % — SIGNIFICANT CHANGE UP (ref 0–1.5)
INR BLD: 0.89 — SIGNIFICANT CHANGE UP (ref 0.88–1.17)
LYMPHOCYTES # BLD AUTO: 3.28 K/UL — SIGNIFICANT CHANGE UP (ref 1–3.3)
LYMPHOCYTES # BLD AUTO: 36.8 % — SIGNIFICANT CHANGE UP (ref 13–44)
MCHC RBC-ENTMCNC: 33.4 PG — SIGNIFICANT CHANGE UP (ref 27–34)
MCHC RBC-ENTMCNC: 34.8 % — SIGNIFICANT CHANGE UP (ref 32–36)
MCV RBC AUTO: 95.9 FL — SIGNIFICANT CHANGE UP (ref 80–100)
MONOCYTES # BLD AUTO: 0.73 K/UL — SIGNIFICANT CHANGE UP (ref 0–0.9)
MONOCYTES NFR BLD AUTO: 8.2 % — SIGNIFICANT CHANGE UP (ref 2–14)
NEUTROPHILS # BLD AUTO: 4.75 K/UL — SIGNIFICANT CHANGE UP (ref 1.8–7.4)
NEUTROPHILS NFR BLD AUTO: 53.3 % — SIGNIFICANT CHANGE UP (ref 43–77)
NRBC # FLD: 0 — SIGNIFICANT CHANGE UP
PLATELET # BLD AUTO: 215 K/UL — SIGNIFICANT CHANGE UP (ref 150–400)
PMV BLD: 8.7 FL — SIGNIFICANT CHANGE UP (ref 7–13)
POTASSIUM SERPL-MCNC: 4.1 MMOL/L — SIGNIFICANT CHANGE UP (ref 3.5–5.3)
POTASSIUM SERPL-SCNC: 4.1 MMOL/L — SIGNIFICANT CHANGE UP (ref 3.5–5.3)
PROT SERPL-MCNC: 6.9 G/DL — SIGNIFICANT CHANGE UP (ref 6–8.3)
PROTHROM AB SERPL-ACNC: 9.9 SEC — SIGNIFICANT CHANGE UP (ref 9.8–13.1)
RBC # BLD: 4.19 M/UL — LOW (ref 4.2–5.8)
RBC # FLD: 13.2 % — SIGNIFICANT CHANGE UP (ref 10.3–14.5)
SODIUM SERPL-SCNC: 140 MMOL/L — SIGNIFICANT CHANGE UP (ref 135–145)
TROPONIN T SERPL-MCNC: < 0.06 NG/ML — SIGNIFICANT CHANGE UP (ref 0–0.06)
WBC # BLD: 8.92 K/UL — SIGNIFICANT CHANGE UP (ref 3.8–10.5)
WBC # FLD AUTO: 8.92 K/UL — SIGNIFICANT CHANGE UP (ref 3.8–10.5)

## 2018-02-28 PROCEDURE — 99285 EMERGENCY DEPT VISIT HI MDM: CPT

## 2018-02-28 PROCEDURE — 70450 CT HEAD/BRAIN W/O DYE: CPT | Mod: 26

## 2018-02-28 NOTE — ED PROVIDER NOTE - PLAN OF CARE
FOLLOW UP WITH DR. Weaver within the next 2-3 day. Follow up with your Primary Medical Doctor within 2-3days. If results or reports were given to you, show copies of your reports given to you. Take all of your medications as previously prescribed. If worsening speech, new weakness, numbness, tingling, chest pain, shortness of breath, or any other new or concerning symptoms return to the ED.

## 2018-02-28 NOTE — ED PROVIDER NOTE - ATTENDING CONTRIBUTION TO CARE
Attending Attestation: Dr. Ann  I have personally performed a history and physical examination of the patient and discussed management with the pa as well as the patient.  I reviewed the PA's note and agree with the documented findings and plan of care.  I have authored and modified critical sections of the Provider Note, including but not limited to HPI, Physical Exam and MDM.  susi nj  61 yo male co 3 d of speech changes, trouble finding words, occasional slurring.  no focal weakness, no vision changes, no ha no nv, no cp sob.  co tingling to left index finger.  no new meds  PE vss, mild anxious, intermittent slurring speech, cn 2-12 intact eomi perrla, ext from strength 5/5, no focal neuro/cerebellar signs, cta bl, rrr, abd soft nt nd bs pos  imp/plan speech changes, r/o cva, check labs, ct ekg, s/o to dr nuñez dispo accordingly

## 2018-02-28 NOTE — ED ADULT TRIAGE NOTE - CHIEF COMPLAINT QUOTE
Pt co difficulty articulating his words x 3 days. Pt admits to being very anxious lately. Pt denies any numbness or tingling or weakness to his extremities . pt evaluated in triage no stroke code. Pts finger stick 90.

## 2018-02-28 NOTE — ED PROVIDER NOTE - CARE PLAN
Principal Discharge DX:	Slurred speech  Assessment and plan of treatment:	FOLLOW UP WITH DR. Weaver within the next 2-3 day. Follow up with your Primary Medical Doctor within 2-3days. If results or reports were given to you, show copies of your reports given to you. Take all of your medications as previously prescribed. If worsening speech, new weakness, numbness, tingling, chest pain, shortness of breath, or any other new or concerning symptoms return to the ED.

## 2018-02-28 NOTE — ED PROVIDER NOTE - OBJECTIVE STATEMENT
63 yo M with PMHx HTN, 63 yo M with PMHx HTN, anxiety presents to the ED c/o trouble speaking x 3 days. Pt states that he can get through a sentence but intermittently at the end the word gets cut off or his garbled. Denies weakness, numbness, tingling, dizziness, headache, visual changes, trouble ambulating, cp ,sob, abdominal pain, urinary symptoms, diarrhea, constipation, nausea, vomiting, social hx.
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
## 2018-02-28 NOTE — ED PROVIDER NOTE - PROGRESS NOTE DETAILS
Haim: Pt seen and examined by neuro. slurring of speech likley anxiety related- pt speaks clearly but when interacting with his partner becomes anxious and then begins to slur again. symptoms have been going on for 3 days. will d/c to f/u with DR Zee of neuro as an outpatient.

## 2018-02-28 NOTE — CONSULT NOTE ADULT - SUBJECTIVE AND OBJECTIVE BOX
HPI:  The patient is a 61 yo M, with PMHx of HTN, PTSD who presented to ED with 3 days complains of speech difficulty which he describes as ha can't articulate with end of some words. This problem is intermittent and started 3 days ago, when he was talking to his psychiatrist regarding to a personal traumatizing incidence, associated with episodes of anxiety. No associated weakness on numbness in the face on body, no aphasia, or facial droop, no double or blurry vision.     MEDICATIONS  (STANDING):    MEDICATIONS  (PRN):    PAST MEDICAL & SURGICAL HISTORY:  Brain bleed  Urinary retention  Renal failure: for 1 day in 2004 secomndary to lithium  BPH (benign prostatic hypertrophy)  PTSD (post-traumatic stress disorder)  Anxiety  HTN (hypertension)  S/P ear surgery: tube placed right ear  S/P cholecystectomy  H/O oral surgery  History of tonsillectomy    FAMILY HISTORY:    Allergies    No Known Allergies    Intolerances    SHx - No smoking, No ETOH, No drug abuse    Review of Systems:  CONSTITUTIONAL:  No weight loss, fever, chills, weakness or fatigue.  HEENT:  Eyes:  No visual loss, blurred vision, double vision or yellow sclerae. Ears, Nose, Throat:  No hearing loss, sneezing, congestion, runny nose or sore throat.  CARDIOVASCULAR:  No chest pain, chest pressure or chest discomfort. No palpitations or edema.  GASTROINTESTINAL:  No anorexia, nausea, vomiting or diarrhea. No abdominal pain or blood.  NEUROLOGICAL: See HPI  MUSCULOSKELETAL:  No muscle, back pain, joint pain or stiffness.  PSYCHIATRIC:  No history of depression or anxiety.      Vital Signs Last 24 Hrs  T(C): 36.7 (28 Feb 2018 17:47), Max: 36.7 (28 Feb 2018 17:47)  T(F): 98 (28 Feb 2018 17:47), Max: 98 (28 Feb 2018 17:47)  HR: 62 (28 Feb 2018 17:47) (62 - 62)  BP: 160/93 (28 Feb 2018 17:47) (160/93 - 160/93)  BP(mean): --  RR: 20 (28 Feb 2018 17:47) (20 - 20)  SpO2: --    General Exam:   General appearance: No acute distress                   Neurological Exam:  Mental Status: Orientated to self, date and place.    Mild intermittent dysarthria, No aphasia or neglect.      Cranial Nerves: CN I - not tested.  PERRL, EOMI, VFF, no nystagmus or diplopia.  No APD.    Fundi not visualized bilaterally.    No facial asymmetry.  Hearing intact to finger rub bilaterally.     Motor:   Tone: normal.                  Strength:     [] Upper extremity                      Delt       Bicep    Tricep                                                  R         5/5        5/5        5/5       5/5                                               L          5/5        5/5        5/5       5/5  [] Lower extremity                       HF          KE          KF        DF         PF                                               R        5/5        5/5        5/5       5/5       5/5                                               L         5/5        5/5       5/5       5/5        5/5  Pronator drift: none                 Dysmetria: BL NL FTN  No truncal ataxia.    Tremor: No resting, postural or action tremor.  No myoclonus.    Sensation: intact to light touch, pinprick, vibration and proprioception    Deep Tendon Reflexes:   Right 2+ : BC, TC, BRD   Left 2+ : BC, TC, BRD  Right 2+ Knee, 1+ ankle  Left 2+ Knee, 1+ ankle    Toes flexor bilaterally  Gait: normal and stable.      Other:    02-28    140  |  103  |  13  ----------------------------<  81  4.1   |  26  |  0.76    Ca    8.6      28 Feb 2018 19:50    TPro  6.9  /  Alb  4.2  /  TBili  0.3  /  DBili  x   /  AST  32  /  ALT  29  /  AlkPhos  47  02-28                          14.0   8.92  )-----------( 215      ( 28 Feb 2018 19:50 )             40.2       Radiology  < from: CT Head No Cont (02.28.18 @ 20:38) >  IMPRESSION:     No evidence of acute intracranial hemorrhage, midline shift or CT   evidence of acute territorial infarct.    < end of copied text >

## 2018-02-28 NOTE — ED ADULT NURSE NOTE - OBJECTIVE STATEMENT
Break coverage- Pt received to intake spot #5. AAOx4, c/o difficulty articulating words x3 days. Also c/o dizziness. Denies recent falls/head injury. No facial droop noted. MD winston performed. #20g IVSL placed to right arm, labs drawn and sent. no acute distress. Awaiting CT scan and further plan of care.

## 2018-03-05 PROCEDURE — 99214 OFFICE O/P EST MOD 30 MIN: CPT

## 2018-03-20 PROCEDURE — 99214 OFFICE O/P EST MOD 30 MIN: CPT

## 2018-04-09 ENCOUNTER — APPOINTMENT (OUTPATIENT)
Dept: UROLOGY | Facility: CLINIC | Age: 63
End: 2018-04-09

## 2018-11-01 NOTE — ED PROVIDER NOTE - PROGRESS NOTE DETAILS
right upper arm Reymundo: Pt medically cleared and signed out to me. Awaiting  eval. Klepfish: Slight leukocytosis, other labs wnl. EKG wnl. Medically cleared, awaiting am bed for [facility] admission.

## 2018-11-21 ENCOUNTER — RX RENEWAL (OUTPATIENT)
Age: 63
End: 2018-11-21

## 2018-11-21 RX ORDER — SILDENAFIL 100 MG/1
100 TABLET, FILM COATED ORAL
Qty: 18 | Refills: 0 | Status: ACTIVE | COMMUNITY
Start: 2018-11-21 | End: 1900-01-01

## 2019-04-15 NOTE — ED PROVIDER NOTE - NS ED MD TWO NIGHTS YN
74 y/o post-menopausal female w/ extensive PMH now s/p D&C and Mirena IUD insertion for post-menopausal bleeding.  Pathology benign- c/w fibroid.   Bleeding improved w/ Mirena and norethindrone 5 mg QD. Yes

## 2019-10-03 ENCOUNTER — INPATIENT (INPATIENT)
Facility: HOSPITAL | Age: 64
LOS: 19 days | Discharge: HOME | End: 2019-10-23
Attending: PSYCHIATRY & NEUROLOGY | Admitting: PSYCHIATRY & NEUROLOGY
Payer: MEDICARE

## 2019-10-03 VITALS
WEIGHT: 194.45 LBS | DIASTOLIC BLOOD PRESSURE: 76 MMHG | HEIGHT: 69 IN | SYSTOLIC BLOOD PRESSURE: 123 MMHG | HEART RATE: 61 BPM

## 2019-10-03 DIAGNOSIS — Z98.890 OTHER SPECIFIED POSTPROCEDURAL STATES: Chronic | ICD-10-CM

## 2019-10-03 DIAGNOSIS — F31.9 BIPOLAR DISORDER, UNSPECIFIED: ICD-10-CM

## 2019-10-03 DIAGNOSIS — Z90.49 ACQUIRED ABSENCE OF OTHER SPECIFIED PARTS OF DIGESTIVE TRACT: Chronic | ICD-10-CM

## 2019-10-03 PROBLEM — N19 UNSPECIFIED KIDNEY FAILURE: Chronic | Status: ACTIVE | Noted: 2017-04-24

## 2019-10-03 PROBLEM — R33.9 RETENTION OF URINE, UNSPECIFIED: Chronic | Status: ACTIVE | Noted: 2017-04-24

## 2019-10-03 PROBLEM — N40.0 BENIGN PROSTATIC HYPERPLASIA WITHOUT LOWER URINARY TRACT SYMPTOMS: Chronic | Status: ACTIVE | Noted: 2017-04-24

## 2019-10-03 PROBLEM — F43.10 POST-TRAUMATIC STRESS DISORDER, UNSPECIFIED: Chronic | Status: ACTIVE | Noted: 2017-04-24

## 2019-10-03 PROBLEM — I61.9 NONTRAUMATIC INTRACEREBRAL HEMORRHAGE, UNSPECIFIED: Chronic | Status: ACTIVE | Noted: 2017-06-15

## 2019-10-03 PROCEDURE — 99222 1ST HOSP IP/OBS MODERATE 55: CPT

## 2019-10-03 RX ORDER — CELECOXIB 200 MG/1
200 CAPSULE ORAL DAILY
Refills: 0 | Status: DISCONTINUED | OUTPATIENT
Start: 2019-10-03 | End: 2019-10-23

## 2019-10-03 RX ORDER — LOSARTAN POTASSIUM 100 MG/1
50 TABLET, FILM COATED ORAL DAILY
Refills: 0 | Status: DISCONTINUED | OUTPATIENT
Start: 2019-10-03 | End: 2019-10-23

## 2019-10-03 RX ORDER — EMTRICITABINE AND TENOFOVIR DISOPROXIL FUMARATE 200; 300 MG/1; MG/1
1 TABLET, FILM COATED ORAL
Qty: 0 | Refills: 0 | DISCHARGE

## 2019-10-03 RX ORDER — ACETAMINOPHEN 500 MG
650 TABLET ORAL EVERY 6 HOURS
Refills: 0 | Status: DISCONTINUED | OUTPATIENT
Start: 2019-10-03 | End: 2019-10-23

## 2019-10-03 RX ORDER — DULOXETINE HYDROCHLORIDE 30 MG/1
60 CAPSULE, DELAYED RELEASE ORAL DAILY
Refills: 0 | Status: DISCONTINUED | OUTPATIENT
Start: 2019-10-03 | End: 2019-10-05

## 2019-10-03 RX ORDER — CELECOXIB 200 MG/1
200 CAPSULE ORAL ONCE
Refills: 0 | Status: COMPLETED | OUTPATIENT
Start: 2019-10-03 | End: 2019-10-03

## 2019-10-03 RX ORDER — HALOPERIDOL DECANOATE 100 MG/ML
2 INJECTION INTRAMUSCULAR ONCE
Refills: 0 | Status: DISCONTINUED | OUTPATIENT
Start: 2019-10-03 | End: 2019-10-04

## 2019-10-03 RX ORDER — TRAZODONE HCL 50 MG
1 TABLET ORAL
Qty: 0 | Refills: 0 | DISCHARGE

## 2019-10-03 RX ORDER — LAMOTRIGINE 25 MG/1
100 TABLET, ORALLY DISINTEGRATING ORAL DAILY
Refills: 0 | Status: DISCONTINUED | OUTPATIENT
Start: 2019-10-03 | End: 2019-10-23

## 2019-10-03 RX ORDER — HALOPERIDOL DECANOATE 100 MG/ML
2 INJECTION INTRAMUSCULAR EVERY 6 HOURS
Refills: 0 | Status: DISCONTINUED | OUTPATIENT
Start: 2019-10-03 | End: 2019-10-04

## 2019-10-04 LAB
ALBUMIN SERPL ELPH-MCNC: 4.6 G/DL — SIGNIFICANT CHANGE UP (ref 3.3–5)
ALP SERPL-CCNC: 56 U/L — SIGNIFICANT CHANGE UP (ref 40–120)
ALT FLD-CCNC: 24 U/L — SIGNIFICANT CHANGE UP (ref 4–41)
ANION GAP SERPL CALC-SCNC: 13 MMO/L — SIGNIFICANT CHANGE UP (ref 7–14)
AST SERPL-CCNC: 25 U/L — SIGNIFICANT CHANGE UP (ref 4–40)
BASOPHILS # BLD AUTO: 0.03 K/UL — SIGNIFICANT CHANGE UP (ref 0–0.2)
BASOPHILS NFR BLD AUTO: 0.4 % — SIGNIFICANT CHANGE UP (ref 0–2)
BILIRUB SERPL-MCNC: 0.6 MG/DL — SIGNIFICANT CHANGE UP (ref 0.2–1.2)
BUN SERPL-MCNC: 17 MG/DL — SIGNIFICANT CHANGE UP (ref 7–23)
CALCIUM SERPL-MCNC: 9.7 MG/DL — SIGNIFICANT CHANGE UP (ref 8.4–10.5)
CHLORIDE SERPL-SCNC: 103 MMOL/L — SIGNIFICANT CHANGE UP (ref 98–107)
CO2 SERPL-SCNC: 23 MMOL/L — SIGNIFICANT CHANGE UP (ref 22–31)
CREAT SERPL-MCNC: 1.07 MG/DL — SIGNIFICANT CHANGE UP (ref 0.5–1.3)
EOSINOPHIL # BLD AUTO: 0.09 K/UL — SIGNIFICANT CHANGE UP (ref 0–0.5)
EOSINOPHIL NFR BLD AUTO: 1.2 % — SIGNIFICANT CHANGE UP (ref 0–6)
FOLATE SERPL-MCNC: > 20 NG/ML — HIGH (ref 4.7–20)
GLUCOSE SERPL-MCNC: 149 MG/DL — HIGH (ref 70–99)
HCT VFR BLD CALC: 53.4 % — HIGH (ref 39–50)
HGB BLD-MCNC: 17.4 G/DL — HIGH (ref 13–17)
IMM GRANULOCYTES NFR BLD AUTO: 0.4 % — SIGNIFICANT CHANGE UP (ref 0–1.5)
LYMPHOCYTES # BLD AUTO: 2.1 K/UL — SIGNIFICANT CHANGE UP (ref 1–3.3)
LYMPHOCYTES # BLD AUTO: 27.1 % — SIGNIFICANT CHANGE UP (ref 13–44)
MCHC RBC-ENTMCNC: 29.5 PG — SIGNIFICANT CHANGE UP (ref 27–34)
MCHC RBC-ENTMCNC: 31.1 % — LOW (ref 32–36)
MCV RBC AUTO: 95.1 FL — SIGNIFICANT CHANGE UP (ref 80–100)
MONOCYTES # BLD AUTO: 0.41 K/UL — SIGNIFICANT CHANGE UP (ref 0–0.9)
MONOCYTES NFR BLD AUTO: 5.3 % — SIGNIFICANT CHANGE UP (ref 2–14)
NEUTROPHILS # BLD AUTO: 5.1 K/UL — SIGNIFICANT CHANGE UP (ref 1.8–7.4)
NEUTROPHILS NFR BLD AUTO: 65.6 % — SIGNIFICANT CHANGE UP (ref 43–77)
NRBC # FLD: 0 K/UL — SIGNIFICANT CHANGE UP (ref 0–0)
PLATELET # BLD AUTO: 321 K/UL — SIGNIFICANT CHANGE UP (ref 150–400)
PMV BLD: 8.9 FL — SIGNIFICANT CHANGE UP (ref 7–13)
POTASSIUM SERPL-MCNC: 4.6 MMOL/L — SIGNIFICANT CHANGE UP (ref 3.5–5.3)
POTASSIUM SERPL-SCNC: 4.6 MMOL/L — SIGNIFICANT CHANGE UP (ref 3.5–5.3)
PROT SERPL-MCNC: 7.7 G/DL — SIGNIFICANT CHANGE UP (ref 6–8.3)
RBC # BLD: 5.89 M/UL — HIGH (ref 4.2–5.8)
RBC # FLD: 13.7 % — SIGNIFICANT CHANGE UP (ref 10.3–14.5)
SODIUM SERPL-SCNC: 139 MMOL/L — SIGNIFICANT CHANGE UP (ref 135–145)
TSH SERPL-MCNC: 1.79 UIU/ML — SIGNIFICANT CHANGE UP (ref 0.27–4.2)
VIT B12 SERPL-MCNC: 981 PG/ML — HIGH (ref 200–900)
WBC # BLD: 7.76 K/UL — SIGNIFICANT CHANGE UP (ref 3.8–10.5)
WBC # FLD AUTO: 7.76 K/UL — SIGNIFICANT CHANGE UP (ref 3.8–10.5)

## 2019-10-04 PROCEDURE — 99223 1ST HOSP IP/OBS HIGH 75: CPT

## 2019-10-04 PROCEDURE — 99232 SBSQ HOSP IP/OBS MODERATE 35: CPT

## 2019-10-04 RX ORDER — LURASIDONE HYDROCHLORIDE 40 MG/1
20 TABLET ORAL DAILY
Refills: 0 | Status: DISCONTINUED | OUTPATIENT
Start: 2019-10-04 | End: 2019-10-07

## 2019-10-04 RX ORDER — GABAPENTIN 400 MG/1
100 CAPSULE ORAL THREE TIMES A DAY
Refills: 0 | Status: DISCONTINUED | OUTPATIENT
Start: 2019-10-04 | End: 2019-10-04

## 2019-10-04 RX ORDER — LURASIDONE HYDROCHLORIDE 40 MG/1
1 TABLET ORAL
Qty: 42 | Refills: 0
Start: 2019-10-04 | End: 2019-11-14

## 2019-10-04 RX ORDER — CLONAZEPAM 1 MG
0.5 TABLET ORAL THREE TIMES A DAY
Refills: 0 | Status: DISCONTINUED | OUTPATIENT
Start: 2019-10-04 | End: 2019-10-06

## 2019-10-04 RX ORDER — CLONAZEPAM 1 MG
0.5 TABLET ORAL
Refills: 0 | Status: DISCONTINUED | OUTPATIENT
Start: 2019-10-04 | End: 2019-10-04

## 2019-10-04 RX ORDER — GABAPENTIN 400 MG/1
200 CAPSULE ORAL THREE TIMES A DAY
Refills: 0 | Status: DISCONTINUED | OUTPATIENT
Start: 2019-10-04 | End: 2019-10-09

## 2019-10-04 RX ADMIN — LAMOTRIGINE 100 MILLIGRAM(S): 25 TABLET, ORALLY DISINTEGRATING ORAL at 08:53

## 2019-10-04 RX ADMIN — LOSARTAN POTASSIUM 50 MILLIGRAM(S): 100 TABLET, FILM COATED ORAL at 08:53

## 2019-10-04 RX ADMIN — CELECOXIB 200 MILLIGRAM(S): 200 CAPSULE ORAL at 08:53

## 2019-10-04 RX ADMIN — DULOXETINE HYDROCHLORIDE 60 MILLIGRAM(S): 30 CAPSULE, DELAYED RELEASE ORAL at 08:53

## 2019-10-04 RX ADMIN — GABAPENTIN 200 MILLIGRAM(S): 400 CAPSULE ORAL at 20:31

## 2019-10-04 RX ADMIN — Medication 0.5 MILLIGRAM(S): at 20:31

## 2019-10-04 RX ADMIN — GABAPENTIN 100 MILLIGRAM(S): 400 CAPSULE ORAL at 13:41

## 2019-10-04 NOTE — CONSULT NOTE ADULT - ASSESSMENT
63 y/o M with PMH of HTN, depression, anxiety, chronic back pain who presented to [facility] after overdosing on 90 1mg Klonopin pills seen for post acute care follow up    # Overdose - s/p ingestion of Klonopin Denies it to be a suicide attempt. Labs from [facility] reviewed and largely unremarkable. EKG unremarkable. On klonopin now to avoid benzo withdrawal.    # Back pain - multiple musculoskeletal complaints. Istop performed reference #[id number]. Patient is on cannabis drops(luxlyte and curaleaf) (last dispensed 9/25). At this time at [facility] - would recommend multimodal pain management with Neurontin tylenol. NSAIDS (normal renal function), lidocaine patch and cymbalta. Avoid narcotics give abuse potential in this patient.     #HTN - c/w losartan. Will monitor BP and titrate as needed.     #Depression and anxiety - management per psych    #Polycythemia - Hgb slightly elevated today however records from [facility] reviewed and were normal 2 days ago. Increase PO hydration and repeat CBC next week.     Case discussed with psychiatrist  Dr. Hanna 63 y/o M with PMH of HTN, depression, anxiety, chronic back pain who presented to [facility] after overdosing on 90 1mg Klonopin pills seen for post acute care follow up    # Overdose - s/p ingestion of Klonopin Denies it to be a suicide attempt. Labs from [facility] reviewed and largely unremarkable. EKG unremarkable. On klonopin now to avoid benzo withdrawal.    # Chronic pain - multiple musculoskeletal complaints. Istop performed reference #[id number]. Patient is on cannabis drops(luxlyte and curaleaf) (last dispensed 9/25). At this time at [facility] - would recommend multimodal pain management with Neurontin tylenol. NSAIDS (normal renal function), lidocaine patch and cymbalta. Avoid narcotics give abuse potential in this patient.     #HTN - c/w losartan. Will monitor BP and titrate as needed.     #Depression and anxiety - management per psych    #Polycythemia - Hgb slightly elevated today however records from [facility] reviewed and were normal 2 days ago. Increase PO hydration and repeat CBC next week.     Case discussed with psychiatrist  Dr. Hanna

## 2019-10-04 NOTE — CONSULT NOTE ADULT - SUBJECTIVE AND OBJECTIVE BOX
HPI: 63 y/o M with PMH of HTN, depression, anxiety, chronic back pain who presented to [facility] after overdosing on 90 1mg Klonopin pills. Patient states that it was a stunt as he was annoyed by his . He didnt mean to harm himself and he called EMS right away. Patient confrontational during the interview. Insistent that I check his MRI results in the chart. Also unhappy that he has not received his Neurontin yet. Reports multiple musculoskeletal complaints today. Has 2/10 back pain, 8/10 left hip pain and 7/10 left knee pain. States that his orthopedic doctor is head of [facility]. Also reports that his neurosurgeon is head of [facility]. 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 He has tried dilaudid, valium and percocet in past. Currently reports taking neurontin and cymbalta. Says that he had back surgery in May and focused on his MRI results which should be in the chart. Refused to discuss any further until I check his MRI results from [facility] radiology.     PAST MEDICAL & SURGICAL HISTORY:  Brain bleed  Urinary retention  Renal failure: for 1 day in 2004 secomndary to lithium  BPH (benign prostatic hypertrophy)  PTSD (post-traumatic stress disorder)  Anxiety  HTN (hypertension)  S/P ear surgery: tube placed right ear  S/P cholecystectomy  H/O oral surgery  History of tonsillectomy  s/p hernia repair      Review of Systems: Limited ROS as patient is poor historian and focused on his MRI results.   CONSTITUTIONAL: No fever  RESPIRATORY:  No shortness of breath  CARDIOVASCULAR: No chest pain,   GASTROINTESTINAL: refused to answer  GENITOURINARY: refused to answer  NEUROLOGICAL: leg pain    Allergies    No Known Allergies    Intolerances    Social History: refused tto answer    FAMILY HISTORY:  Refused to answer    MEDICATIONS  (STANDING):  celecoxib 200 milliGRAM(s) Oral daily  clonazePAM  Tablet 0.5 milliGRAM(s) Oral two times a day  DULoxetine 60 milliGRAM(s) Oral daily  gabapentin 100 milliGRAM(s) Oral three times a day  lamoTRIgine 100 milliGRAM(s) Oral daily  losartan 50 milliGRAM(s) Oral daily    MEDICATIONS  (PRN):  acetaminophen   Tablet .. 650 milliGRAM(s) Oral every 6 hours PRN Mild Pain (1 - 3), Moderate Pain (4 - 6)  haloperidol     Tablet 2 milliGRAM(s) Oral every 6 hours PRN agitation  haloperidol    Injectable 2 milliGRAM(s) IntraMuscular once PRN severe agitation    Vital Signs Last 24 Hrs  T(C): 36.2 (04 Oct 2019 08:50), Max: 36.2 (04 Oct 2019 08:50)  T(F): 97.2 (04 Oct 2019 08:50), Max: 97.2 (04 Oct 2019 08:50)  HR: 61 (03 Oct 2019 19:00) (61 - 61)  BP: 123/76 (03 Oct 2019 19:00) (123/76 - 123/76)  BP(mean): --  RR: --16  SpO2: --      PHYSICAL EXAM: Refused exam  GENERAL: NAD, well-developed, mildly agitated  HEAD:  Atraumatic, Normocephalic  EYES: EOMI, PERRLA,   CHEST/LUNG: no respiratory distress   ABDOMEN: not distended appearing   EXTREMITIES:  2+ Peripheral Pulses, No clubbing, cyanosis, or edema  PSYCH: awake and alert  NEUROLOGY: non-focal  SKIN: healing scar mid back lumbar region (visual inspection only)    LABS:                        17.4   7.76  )-----------( 321      ( 04 Oct 2019 09:15 )             53.4     10-04    139  |  103  |  17  ----------------------------<  149<H>  4.6   |  23  |  1.07    Ca    9.7      04 Oct 2019 09:15    TPro  7.7  /  Alb  4.6  /  TBili  0.6  /  DBili  x   /  AST  25  /  ALT  24  /  AlkPhos  56  10-04    EKG(personally reviewed): Reviewed from [facility] - sinus tach qtc 431    RADIOLOGY & ADDITIONAL TESTS: Records from [facility] reviewed     Imaging Personally Reviewed:    Consultant(s) Notes Reviewed:      Care Discussed with Consultants/Other Providers:

## 2019-10-05 PROCEDURE — 99232 SBSQ HOSP IP/OBS MODERATE 35: CPT

## 2019-10-05 RX ORDER — DULOXETINE HYDROCHLORIDE 30 MG/1
40 CAPSULE, DELAYED RELEASE ORAL DAILY
Refills: 0 | Status: DISCONTINUED | OUTPATIENT
Start: 2019-10-05 | End: 2019-10-06

## 2019-10-05 RX ADMIN — Medication 0.5 MILLIGRAM(S): at 12:29

## 2019-10-05 RX ADMIN — LOSARTAN POTASSIUM 50 MILLIGRAM(S): 100 TABLET, FILM COATED ORAL at 09:30

## 2019-10-05 RX ADMIN — GABAPENTIN 200 MILLIGRAM(S): 400 CAPSULE ORAL at 12:29

## 2019-10-05 RX ADMIN — LAMOTRIGINE 100 MILLIGRAM(S): 25 TABLET, ORALLY DISINTEGRATING ORAL at 09:30

## 2019-10-05 RX ADMIN — Medication 0.5 MILLIGRAM(S): at 09:30

## 2019-10-05 RX ADMIN — Medication 0.5 MILLIGRAM(S): at 21:24

## 2019-10-05 RX ADMIN — GABAPENTIN 200 MILLIGRAM(S): 400 CAPSULE ORAL at 09:30

## 2019-10-05 RX ADMIN — CELECOXIB 200 MILLIGRAM(S): 200 CAPSULE ORAL at 09:30

## 2019-10-05 RX ADMIN — GABAPENTIN 200 MILLIGRAM(S): 400 CAPSULE ORAL at 21:24

## 2019-10-05 RX ADMIN — LURASIDONE HYDROCHLORIDE 20 MILLIGRAM(S): 40 TABLET ORAL at 09:30

## 2019-10-05 RX ADMIN — DULOXETINE HYDROCHLORIDE 60 MILLIGRAM(S): 30 CAPSULE, DELAYED RELEASE ORAL at 09:30

## 2019-10-05 RX ADMIN — CELECOXIB 200 MILLIGRAM(S): 200 CAPSULE ORAL at 10:00

## 2019-10-06 PROCEDURE — 99232 SBSQ HOSP IP/OBS MODERATE 35: CPT

## 2019-10-06 RX ORDER — DULOXETINE HYDROCHLORIDE 30 MG/1
30 CAPSULE, DELAYED RELEASE ORAL DAILY
Refills: 0 | Status: DISCONTINUED | OUTPATIENT
Start: 2019-10-06 | End: 2019-10-09

## 2019-10-06 RX ORDER — CLONAZEPAM 1 MG
0.5 TABLET ORAL
Refills: 0 | Status: DISCONTINUED | OUTPATIENT
Start: 2019-10-06 | End: 2019-10-09

## 2019-10-06 RX ADMIN — GABAPENTIN 200 MILLIGRAM(S): 400 CAPSULE ORAL at 21:26

## 2019-10-06 RX ADMIN — Medication 0.5 MILLIGRAM(S): at 21:26

## 2019-10-06 RX ADMIN — DULOXETINE HYDROCHLORIDE 30 MILLIGRAM(S): 30 CAPSULE, DELAYED RELEASE ORAL at 09:31

## 2019-10-06 RX ADMIN — Medication 0.5 MILLIGRAM(S): at 16:30

## 2019-10-06 RX ADMIN — LOSARTAN POTASSIUM 50 MILLIGRAM(S): 100 TABLET, FILM COATED ORAL at 09:32

## 2019-10-06 RX ADMIN — GABAPENTIN 200 MILLIGRAM(S): 400 CAPSULE ORAL at 09:31

## 2019-10-06 RX ADMIN — CELECOXIB 200 MILLIGRAM(S): 200 CAPSULE ORAL at 10:15

## 2019-10-06 RX ADMIN — LAMOTRIGINE 100 MILLIGRAM(S): 25 TABLET, ORALLY DISINTEGRATING ORAL at 09:32

## 2019-10-06 RX ADMIN — GABAPENTIN 200 MILLIGRAM(S): 400 CAPSULE ORAL at 12:42

## 2019-10-06 RX ADMIN — Medication 0.5 MILLIGRAM(S): at 12:42

## 2019-10-06 RX ADMIN — Medication 0.5 MILLIGRAM(S): at 09:31

## 2019-10-06 RX ADMIN — CELECOXIB 200 MILLIGRAM(S): 200 CAPSULE ORAL at 09:31

## 2019-10-06 RX ADMIN — LURASIDONE HYDROCHLORIDE 20 MILLIGRAM(S): 40 TABLET ORAL at 09:32

## 2019-10-07 PROCEDURE — 99232 SBSQ HOSP IP/OBS MODERATE 35: CPT

## 2019-10-07 RX ORDER — CARIPRAZINE 1.5 MG/1
1 CAPSULE, GELATIN COATED ORAL
Qty: 30 | Refills: 0
Start: 2019-10-07 | End: 2019-11-05

## 2019-10-07 RX ADMIN — GABAPENTIN 200 MILLIGRAM(S): 400 CAPSULE ORAL at 20:51

## 2019-10-07 RX ADMIN — GABAPENTIN 200 MILLIGRAM(S): 400 CAPSULE ORAL at 12:33

## 2019-10-07 RX ADMIN — Medication 0.5 MILLIGRAM(S): at 20:51

## 2019-10-07 RX ADMIN — GABAPENTIN 200 MILLIGRAM(S): 400 CAPSULE ORAL at 09:14

## 2019-10-07 RX ADMIN — LOSARTAN POTASSIUM 50 MILLIGRAM(S): 100 TABLET, FILM COATED ORAL at 09:14

## 2019-10-07 RX ADMIN — CELECOXIB 200 MILLIGRAM(S): 200 CAPSULE ORAL at 10:30

## 2019-10-07 RX ADMIN — DULOXETINE HYDROCHLORIDE 30 MILLIGRAM(S): 30 CAPSULE, DELAYED RELEASE ORAL at 09:14

## 2019-10-07 RX ADMIN — Medication 0.5 MILLIGRAM(S): at 09:14

## 2019-10-07 RX ADMIN — Medication 0.5 MILLIGRAM(S): at 12:33

## 2019-10-07 RX ADMIN — Medication 0.5 MILLIGRAM(S): at 17:39

## 2019-10-07 RX ADMIN — LAMOTRIGINE 100 MILLIGRAM(S): 25 TABLET, ORALLY DISINTEGRATING ORAL at 09:14

## 2019-10-07 RX ADMIN — LURASIDONE HYDROCHLORIDE 20 MILLIGRAM(S): 40 TABLET ORAL at 09:14

## 2019-10-07 RX ADMIN — CELECOXIB 200 MILLIGRAM(S): 200 CAPSULE ORAL at 09:14

## 2019-10-08 LAB
BASOPHILS # BLD AUTO: 0.02 K/UL — SIGNIFICANT CHANGE UP (ref 0–0.2)
BASOPHILS NFR BLD AUTO: 0.3 % — SIGNIFICANT CHANGE UP (ref 0–2)
EOSINOPHIL # BLD AUTO: 0.08 K/UL — SIGNIFICANT CHANGE UP (ref 0–0.5)
EOSINOPHIL NFR BLD AUTO: 1.1 % — SIGNIFICANT CHANGE UP (ref 0–6)
HCT VFR BLD CALC: 53.4 % — HIGH (ref 39–50)
HCT VFR BLD CALC: 53.4 % — HIGH (ref 39–50)
HGB BLD-MCNC: 17.1 G/DL — HIGH (ref 13–17)
HGB BLD-MCNC: 17.1 G/DL — HIGH (ref 13–17)
IMM GRANULOCYTES NFR BLD AUTO: 0.3 % — SIGNIFICANT CHANGE UP (ref 0–1.5)
LYMPHOCYTES # BLD AUTO: 2.39 K/UL — SIGNIFICANT CHANGE UP (ref 1–3.3)
LYMPHOCYTES # BLD AUTO: 31.8 % — SIGNIFICANT CHANGE UP (ref 13–44)
MCHC RBC-ENTMCNC: 30 PG — SIGNIFICANT CHANGE UP (ref 27–34)
MCHC RBC-ENTMCNC: 30 PG — SIGNIFICANT CHANGE UP (ref 27–34)
MCHC RBC-ENTMCNC: 32 % — SIGNIFICANT CHANGE UP (ref 32–36)
MCHC RBC-ENTMCNC: 32 % — SIGNIFICANT CHANGE UP (ref 32–36)
MCV RBC AUTO: 93.7 FL — SIGNIFICANT CHANGE UP (ref 80–100)
MCV RBC AUTO: 93.7 FL — SIGNIFICANT CHANGE UP (ref 80–100)
MONOCYTES # BLD AUTO: 0.6 K/UL — SIGNIFICANT CHANGE UP (ref 0–0.9)
MONOCYTES NFR BLD AUTO: 8 % — SIGNIFICANT CHANGE UP (ref 2–14)
NEUTROPHILS # BLD AUTO: 4.4 K/UL — SIGNIFICANT CHANGE UP (ref 1.8–7.4)
NEUTROPHILS NFR BLD AUTO: 58.5 % — SIGNIFICANT CHANGE UP (ref 43–77)
NRBC # FLD: 0 K/UL — SIGNIFICANT CHANGE UP (ref 0–0)
NRBC # FLD: 0 K/UL — SIGNIFICANT CHANGE UP (ref 0–0)
PLATELET # BLD AUTO: 276 K/UL — SIGNIFICANT CHANGE UP (ref 150–400)
PLATELET # BLD AUTO: 276 K/UL — SIGNIFICANT CHANGE UP (ref 150–400)
PMV BLD: 9 FL — SIGNIFICANT CHANGE UP (ref 7–13)
PMV BLD: 9 FL — SIGNIFICANT CHANGE UP (ref 7–13)
RBC # BLD: 5.7 M/UL — SIGNIFICANT CHANGE UP (ref 4.2–5.8)
RBC # BLD: 5.7 M/UL — SIGNIFICANT CHANGE UP (ref 4.2–5.8)
RBC # FLD: 13.6 % — SIGNIFICANT CHANGE UP (ref 10.3–14.5)
RBC # FLD: 13.6 % — SIGNIFICANT CHANGE UP (ref 10.3–14.5)
WBC # BLD: 7.51 K/UL — SIGNIFICANT CHANGE UP (ref 3.8–10.5)
WBC # BLD: 7.51 K/UL — SIGNIFICANT CHANGE UP (ref 3.8–10.5)
WBC # FLD AUTO: 7.51 K/UL — SIGNIFICANT CHANGE UP (ref 3.8–10.5)
WBC # FLD AUTO: 7.51 K/UL — SIGNIFICANT CHANGE UP (ref 3.8–10.5)

## 2019-10-08 PROCEDURE — 99233 SBSQ HOSP IP/OBS HIGH 50: CPT

## 2019-10-08 PROCEDURE — 90853 GROUP PSYCHOTHERAPY: CPT

## 2019-10-08 PROCEDURE — 99232 SBSQ HOSP IP/OBS MODERATE 35: CPT

## 2019-10-08 RX ADMIN — GABAPENTIN 200 MILLIGRAM(S): 400 CAPSULE ORAL at 09:42

## 2019-10-08 RX ADMIN — CELECOXIB 200 MILLIGRAM(S): 200 CAPSULE ORAL at 09:42

## 2019-10-08 RX ADMIN — GABAPENTIN 200 MILLIGRAM(S): 400 CAPSULE ORAL at 13:13

## 2019-10-08 RX ADMIN — Medication 0.5 MILLIGRAM(S): at 21:06

## 2019-10-08 RX ADMIN — CELECOXIB 200 MILLIGRAM(S): 200 CAPSULE ORAL at 10:31

## 2019-10-08 RX ADMIN — LOSARTAN POTASSIUM 50 MILLIGRAM(S): 100 TABLET, FILM COATED ORAL at 09:42

## 2019-10-08 RX ADMIN — Medication 0.5 MILLIGRAM(S): at 16:44

## 2019-10-08 RX ADMIN — Medication 0.5 MILLIGRAM(S): at 09:42

## 2019-10-08 RX ADMIN — DULOXETINE HYDROCHLORIDE 30 MILLIGRAM(S): 30 CAPSULE, DELAYED RELEASE ORAL at 09:42

## 2019-10-08 RX ADMIN — LAMOTRIGINE 100 MILLIGRAM(S): 25 TABLET, ORALLY DISINTEGRATING ORAL at 09:42

## 2019-10-08 RX ADMIN — Medication 0.5 MILLIGRAM(S): at 13:13

## 2019-10-08 RX ADMIN — GABAPENTIN 200 MILLIGRAM(S): 400 CAPSULE ORAL at 21:06

## 2019-10-08 NOTE — PROGRESS NOTE ADULT - SUBJECTIVE AND OBJECTIVE BOX
Patient is a 64y old  Male who presents with a chief complaint of Overdose (04 Oct 2019 14:47)      SUBJECTIVE / OVERNIGHT EVENTS:  Patient continues to complain of widespread pain. No fevers or chills.     MEDICATIONS  (STANDING):  celecoxib 200 milliGRAM(s) Oral daily  clonazePAM  Tablet 0.5 milliGRAM(s) Oral four times a day  DULoxetine 30 milliGRAM(s) Oral daily  gabapentin 200 milliGRAM(s) Oral three times a day  lamoTRIgine 100 milliGRAM(s) Oral daily  losartan 50 milliGRAM(s) Oral daily  Vraylar (Cariprazine) 1.5 milliGRAM(s) 1.5 milliGRAM(s) Oral daily    MEDICATIONS  (PRN):  acetaminophen   Tablet .. 650 milliGRAM(s) Oral every 6 hours PRN Mild Pain (1 - 3), Moderate Pain (4 - 6)  artificial  tears Solution 1 Drop(s) Both EYES four times a day PRN Dry Eyes  LORazepam     Tablet 1 milliGRAM(s) Oral every 6 hours PRN anxiety      PHYSICAL EXAM:  T(C): 36.6 (10-08-19 @ 08:41), Max: 36.6 (10-07-19 @ 15:44)  HR: --  BP: --  RR: --  SpO2: --  I&O's Summary    GENERAL: NAD, well-developed  EYES:  conjunctiva and sclera clear  CHEST/LUNG: Clear to auscultation bilaterally; No wheeze  HEART: Regular rate and rhythm; No murmurs, rubs, or gallops  ABDOMEN: Soft, Nontender, Nondistended; Bowel sounds present  EXTREMITIES:  2+ Peripheral Pulses, No clubbing, cyanosis, or edema  PSYCH: AAOx3  NEUROLOGY: CN II-XII grossly intact, moving all extremities  SKIN: No rashes or lesions    LABS:  CAPILLARY BLOOD GLUCOSE                              17.1   7.51  )-----------( 276      ( 08 Oct 2019 08:35 )             53.4                     RADIOLOGY & ADDITIONAL TESTS:    Imaging Personally Reviewed:    Consultant(s) Notes Reviewed:      Care Discussed with Consultants/Other Providers: Patient is a 64y old  Male who presents with a chief complaint of Overdose (04 Oct 2019 14:47)      SUBJECTIVE / OVERNIGHT EVENTS:  Patient continues to complain of widespread pain. No fevers or chills. Patient was told that the medical team is following for polycythemia. 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 He is also stated that he is concerned because there is hemophilia in his family. The patient states that he doesn't smoke however upon further  questioning he      MEDICATIONS  (STANDING):  celecoxib 200 milliGRAM(s) Oral daily  clonazePAM  Tablet 0.5 milliGRAM(s) Oral four times a day  DULoxetine 30 milliGRAM(s) Oral daily  gabapentin 200 milliGRAM(s) Oral three times a day  lamoTRIgine 100 milliGRAM(s) Oral daily  losartan 50 milliGRAM(s) Oral daily  Vraylar (Cariprazine) 1.5 milliGRAM(s) 1.5 milliGRAM(s) Oral daily    MEDICATIONS  (PRN):  acetaminophen   Tablet .. 650 milliGRAM(s) Oral every 6 hours PRN Mild Pain (1 - 3), Moderate Pain (4 - 6)  artificial  tears Solution 1 Drop(s) Both EYES four times a day PRN Dry Eyes  LORazepam     Tablet 1 milliGRAM(s) Oral every 6 hours PRN anxiety      PHYSICAL EXAM:  T(C): 36.6 (10-08-19 @ 08:41), Max: 36.6 (10-07-19 @ 15:44)  HR: --  BP: --  RR: --  SpO2: --  I&O's Summary    GENERAL: NAD, well-developed  EYES:  conjunctiva and sclera clear  CHEST/LUNG: Clear to auscultation bilaterally; No wheeze  HEART: Regular rate and rhythm; No murmurs, rubs, or gallops  ABDOMEN: Soft, Nontender, Nondistended; Bowel sounds present  EXTREMITIES:  2+ Peripheral Pulses, No clubbing, cyanosis, or edema  PSYCH: AAOx3  NEUROLOGY: CN II-XII grossly intact, moving all extremities  SKIN: No rashes or lesions    LABS:  CAPILLARY BLOOD GLUCOSE                              17.1   7.51  )-----------( 276      ( 08 Oct 2019 08:35 )             53.4                     RADIOLOGY & ADDITIONAL TESTS:    Imaging Personally Reviewed:    Consultant(s) Notes Reviewed:      Care Discussed with Consultants/Other Providers: Patient is a 64y old  Male who presents with a chief complaint of Overdose (04 Oct 2019 14:47)      SUBJECTIVE / OVERNIGHT EVENTS:  Patient continues to complain of widespread pain. No fevers or chills. Patient was told that the medical team is following for polycythemia. 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 He is also stated that he is concerned because there is hemophilia in his family. The patient says that he doesn't smoke however upon further  questioning he admits to quitting 2 weeks ago.      MEDICATIONS  (STANDING):  celecoxib 200 milliGRAM(s) Oral daily  clonazePAM  Tablet 0.5 milliGRAM(s) Oral four times a day  DULoxetine 30 milliGRAM(s) Oral daily  gabapentin 200 milliGRAM(s) Oral three times a day  lamoTRIgine 100 milliGRAM(s) Oral daily  losartan 50 milliGRAM(s) Oral daily  Vraylar (Cariprazine) 1.5 milliGRAM(s) 1.5 milliGRAM(s) Oral daily    MEDICATIONS  (PRN):  acetaminophen   Tablet .. 650 milliGRAM(s) Oral every 6 hours PRN Mild Pain (1 - 3), Moderate Pain (4 - 6)  artificial  tears Solution 1 Drop(s) Both EYES four times a day PRN Dry Eyes  LORazepam     Tablet 1 milliGRAM(s) Oral every 6 hours PRN anxiety      PHYSICAL EXAM:  T(C): 36.6 (10-08-19 @ 08:41), Max: 36.6 (10-07-19 @ 15:44)  HR: --  BP: --  RR: --  SpO2: --  I&O's Summary    GENERAL: NAD, well-developed  EYES:  conjunctiva and sclera clear  CHEST/LUNG: Clear to auscultation bilaterally; No wheeze  HEART: Regular rate and rhythm; No murmurs, rubs, or gallops  ABDOMEN: Soft, Nontender, Nondistended; Bowel sounds present  EXTREMITIES:  2+ Peripheral Pulses, No clubbing, cyanosis, or edema  PSYCH: AAOx3  NEUROLOGY: CN II-XII grossly intact, moving all extremities  SKIN: No rashes or lesions    LABS:  CAPILLARY BLOOD GLUCOSE                              17.1   7.51  )-----------( 276      ( 08 Oct 2019 08:35 )             53.4                     RADIOLOGY & ADDITIONAL TESTS:    Imaging Personally Reviewed:    Consultant(s) Notes Reviewed:      Care Discussed with Consultants/Other Providers:

## 2019-10-08 NOTE — PROGRESS NOTE ADULT - ASSESSMENT
65 y/o M with PMH of HTN, depression, anxiety, chronic back pain who presented to [facility] after overdosing on 90 1mg Klonopin pills seen for post acute care follow up    # Overdose - s/p ingestion of Klonopin Denies it to be a suicide attempt. Labs from [facility] reviewed and largely unremarkable. EKG unremarkable.   # Chronic pain - multiple musculoskeletal complaints. Istop performed reference #[id number]. Patient is on cannabis drops(luxlyte and curaleaf) (last dispensed 9/25). At this time at [facility] - would recommend multimodal pain management with Neurontin tylenol. NSAIDS (normal renal function), lidocaine patch and cymbalta. Avoid narcotics give abuse potential in this patient.   #HTN - c/w losartan. Will monitor BP and titrate as needed.   #Depression and anxiety - management per psych  #Polycythemia - Hgb slightly elevated (17.1) however records from [facility] reviewed and it was normal during his hospitalization. Will continue to encourage oral hydration. Will order another CBC for Oct 15th if patient is still here. Otherwise patient can follow as an outpatient. 65 y/o M with PMH of HTN, depression, anxiety, chronic back pain who presented to [facility] after overdosing on 90 1mg Klonopin pills seen for post acute care follow up    # Overdose - s/p ingestion of Klonopin Denies it to be a suicide attempt. Labs from [facility] reviewed and largely unremarkable. EKG unremarkable.   # Chronic pain - multiple musculoskeletal complaints. Istop performed reference #[id number]. Patient is on cannabis drops(luxlyte and curaleaf) (last dispensed 9/25). At this time at [facility] - would recommend multimodal pain management with Neurontin tylenol. NSAIDS (normal renal function), lidocaine patch and cymbalta. Avoid narcotics give abuse potential in this patient.   #HTN - c/w losartan. Will monitor BP and titrate as needed.   #Depression and anxiety - management per psych  #Polycythemia - Hgb slightly elevated (17.1) however records from [facility] reviewed and it was normal during his hospitalization. Patient admits to being smoker "quit 2 weeks ago". Will continue to encourage oral hydration. Will order another CBC for Oct 15th if patient is still here. Otherwise patient can follow as an outpatient.

## 2019-10-09 PROCEDURE — 99232 SBSQ HOSP IP/OBS MODERATE 35: CPT

## 2019-10-09 RX ORDER — DULOXETINE HYDROCHLORIDE 30 MG/1
20 CAPSULE, DELAYED RELEASE ORAL DAILY
Refills: 0 | Status: DISCONTINUED | OUTPATIENT
Start: 2019-10-09 | End: 2019-10-15

## 2019-10-09 RX ORDER — CLONAZEPAM 1 MG
0.5 TABLET ORAL THREE TIMES A DAY
Refills: 0 | Status: DISCONTINUED | OUTPATIENT
Start: 2019-10-09 | End: 2019-10-15

## 2019-10-09 RX ORDER — GABAPENTIN 400 MG/1
300 CAPSULE ORAL THREE TIMES A DAY
Refills: 0 | Status: DISCONTINUED | OUTPATIENT
Start: 2019-10-09 | End: 2019-10-12

## 2019-10-09 RX ADMIN — DULOXETINE HYDROCHLORIDE 20 MILLIGRAM(S): 30 CAPSULE, DELAYED RELEASE ORAL at 09:18

## 2019-10-09 RX ADMIN — CELECOXIB 200 MILLIGRAM(S): 200 CAPSULE ORAL at 10:04

## 2019-10-09 RX ADMIN — Medication 0.5 MILLIGRAM(S): at 21:15

## 2019-10-09 RX ADMIN — GABAPENTIN 300 MILLIGRAM(S): 400 CAPSULE ORAL at 12:26

## 2019-10-09 RX ADMIN — GABAPENTIN 300 MILLIGRAM(S): 400 CAPSULE ORAL at 09:19

## 2019-10-09 RX ADMIN — LOSARTAN POTASSIUM 50 MILLIGRAM(S): 100 TABLET, FILM COATED ORAL at 09:19

## 2019-10-09 RX ADMIN — Medication 0.5 MILLIGRAM(S): at 12:26

## 2019-10-09 RX ADMIN — CELECOXIB 200 MILLIGRAM(S): 200 CAPSULE ORAL at 09:18

## 2019-10-09 RX ADMIN — Medication 1 DROP(S): at 21:41

## 2019-10-09 RX ADMIN — Medication 0.5 MILLIGRAM(S): at 09:18

## 2019-10-09 RX ADMIN — GABAPENTIN 300 MILLIGRAM(S): 400 CAPSULE ORAL at 21:15

## 2019-10-09 RX ADMIN — LAMOTRIGINE 100 MILLIGRAM(S): 25 TABLET, ORALLY DISINTEGRATING ORAL at 09:19

## 2019-10-10 PROCEDURE — 90853 GROUP PSYCHOTHERAPY: CPT

## 2019-10-10 PROCEDURE — 99232 SBSQ HOSP IP/OBS MODERATE 35: CPT

## 2019-10-10 RX ADMIN — LAMOTRIGINE 100 MILLIGRAM(S): 25 TABLET, ORALLY DISINTEGRATING ORAL at 09:24

## 2019-10-10 RX ADMIN — GABAPENTIN 300 MILLIGRAM(S): 400 CAPSULE ORAL at 20:57

## 2019-10-10 RX ADMIN — LOSARTAN POTASSIUM 50 MILLIGRAM(S): 100 TABLET, FILM COATED ORAL at 09:24

## 2019-10-10 RX ADMIN — GABAPENTIN 300 MILLIGRAM(S): 400 CAPSULE ORAL at 13:25

## 2019-10-10 RX ADMIN — Medication 0.5 MILLIGRAM(S): at 09:23

## 2019-10-10 RX ADMIN — Medication 0.5 MILLIGRAM(S): at 13:25

## 2019-10-10 RX ADMIN — CELECOXIB 200 MILLIGRAM(S): 200 CAPSULE ORAL at 09:23

## 2019-10-10 RX ADMIN — Medication 0.5 MILLIGRAM(S): at 20:57

## 2019-10-10 RX ADMIN — GABAPENTIN 300 MILLIGRAM(S): 400 CAPSULE ORAL at 09:24

## 2019-10-10 RX ADMIN — DULOXETINE HYDROCHLORIDE 20 MILLIGRAM(S): 30 CAPSULE, DELAYED RELEASE ORAL at 09:24

## 2019-10-10 RX ADMIN — CELECOXIB 200 MILLIGRAM(S): 200 CAPSULE ORAL at 10:32

## 2019-10-11 PROCEDURE — 99232 SBSQ HOSP IP/OBS MODERATE 35: CPT

## 2019-10-11 RX ADMIN — DULOXETINE HYDROCHLORIDE 20 MILLIGRAM(S): 30 CAPSULE, DELAYED RELEASE ORAL at 10:04

## 2019-10-11 RX ADMIN — CELECOXIB 200 MILLIGRAM(S): 200 CAPSULE ORAL at 10:04

## 2019-10-11 RX ADMIN — GABAPENTIN 300 MILLIGRAM(S): 400 CAPSULE ORAL at 13:03

## 2019-10-11 RX ADMIN — Medication 0.5 MILLIGRAM(S): at 20:55

## 2019-10-11 RX ADMIN — Medication 0.5 MILLIGRAM(S): at 08:43

## 2019-10-11 RX ADMIN — CELECOXIB 200 MILLIGRAM(S): 200 CAPSULE ORAL at 10:36

## 2019-10-11 RX ADMIN — LAMOTRIGINE 100 MILLIGRAM(S): 25 TABLET, ORALLY DISINTEGRATING ORAL at 10:04

## 2019-10-11 RX ADMIN — LOSARTAN POTASSIUM 50 MILLIGRAM(S): 100 TABLET, FILM COATED ORAL at 10:04

## 2019-10-11 RX ADMIN — Medication 0.5 MILLIGRAM(S): at 13:04

## 2019-10-11 RX ADMIN — GABAPENTIN 300 MILLIGRAM(S): 400 CAPSULE ORAL at 10:04

## 2019-10-11 RX ADMIN — GABAPENTIN 300 MILLIGRAM(S): 400 CAPSULE ORAL at 20:55

## 2019-10-12 PROCEDURE — 99232 SBSQ HOSP IP/OBS MODERATE 35: CPT

## 2019-10-12 RX ORDER — GABAPENTIN 400 MG/1
400 CAPSULE ORAL THREE TIMES A DAY
Refills: 0 | Status: DISCONTINUED | OUTPATIENT
Start: 2019-10-12 | End: 2019-10-23

## 2019-10-12 RX ADMIN — GABAPENTIN 300 MILLIGRAM(S): 400 CAPSULE ORAL at 09:31

## 2019-10-12 RX ADMIN — GABAPENTIN 400 MILLIGRAM(S): 400 CAPSULE ORAL at 20:51

## 2019-10-12 RX ADMIN — CELECOXIB 200 MILLIGRAM(S): 200 CAPSULE ORAL at 13:05

## 2019-10-12 RX ADMIN — DULOXETINE HYDROCHLORIDE 20 MILLIGRAM(S): 30 CAPSULE, DELAYED RELEASE ORAL at 09:30

## 2019-10-12 RX ADMIN — Medication 650 MILLIGRAM(S): at 23:50

## 2019-10-12 RX ADMIN — Medication 0.5 MILLIGRAM(S): at 12:49

## 2019-10-12 RX ADMIN — GABAPENTIN 400 MILLIGRAM(S): 400 CAPSULE ORAL at 12:49

## 2019-10-12 RX ADMIN — Medication 0.5 MILLIGRAM(S): at 20:51

## 2019-10-12 RX ADMIN — Medication 0.5 MILLIGRAM(S): at 09:30

## 2019-10-12 RX ADMIN — LAMOTRIGINE 100 MILLIGRAM(S): 25 TABLET, ORALLY DISINTEGRATING ORAL at 09:31

## 2019-10-12 RX ADMIN — LOSARTAN POTASSIUM 50 MILLIGRAM(S): 100 TABLET, FILM COATED ORAL at 09:31

## 2019-10-12 RX ADMIN — CELECOXIB 200 MILLIGRAM(S): 200 CAPSULE ORAL at 09:30

## 2019-10-13 RX ADMIN — LOSARTAN POTASSIUM 50 MILLIGRAM(S): 100 TABLET, FILM COATED ORAL at 09:56

## 2019-10-13 RX ADMIN — Medication 650 MILLIGRAM(S): at 23:00

## 2019-10-13 RX ADMIN — Medication 1 DROP(S): at 22:00

## 2019-10-13 RX ADMIN — CELECOXIB 200 MILLIGRAM(S): 200 CAPSULE ORAL at 09:55

## 2019-10-13 RX ADMIN — Medication 650 MILLIGRAM(S): at 09:56

## 2019-10-13 RX ADMIN — Medication 650 MILLIGRAM(S): at 07:45

## 2019-10-13 RX ADMIN — Medication 0.5 MILLIGRAM(S): at 13:19

## 2019-10-13 RX ADMIN — GABAPENTIN 400 MILLIGRAM(S): 400 CAPSULE ORAL at 09:56

## 2019-10-13 RX ADMIN — Medication 650 MILLIGRAM(S): at 23:59

## 2019-10-13 RX ADMIN — Medication 0.5 MILLIGRAM(S): at 21:47

## 2019-10-13 RX ADMIN — DULOXETINE HYDROCHLORIDE 20 MILLIGRAM(S): 30 CAPSULE, DELAYED RELEASE ORAL at 09:56

## 2019-10-13 RX ADMIN — GABAPENTIN 400 MILLIGRAM(S): 400 CAPSULE ORAL at 21:47

## 2019-10-13 RX ADMIN — LAMOTRIGINE 100 MILLIGRAM(S): 25 TABLET, ORALLY DISINTEGRATING ORAL at 09:56

## 2019-10-13 RX ADMIN — Medication 0.5 MILLIGRAM(S): at 09:56

## 2019-10-13 RX ADMIN — GABAPENTIN 400 MILLIGRAM(S): 400 CAPSULE ORAL at 13:19

## 2019-10-14 RX ADMIN — GABAPENTIN 400 MILLIGRAM(S): 400 CAPSULE ORAL at 08:33

## 2019-10-14 RX ADMIN — GABAPENTIN 400 MILLIGRAM(S): 400 CAPSULE ORAL at 13:17

## 2019-10-14 RX ADMIN — CELECOXIB 200 MILLIGRAM(S): 200 CAPSULE ORAL at 09:30

## 2019-10-14 RX ADMIN — Medication 0.5 MILLIGRAM(S): at 08:33

## 2019-10-14 RX ADMIN — CELECOXIB 200 MILLIGRAM(S): 200 CAPSULE ORAL at 08:33

## 2019-10-14 RX ADMIN — Medication 650 MILLIGRAM(S): at 22:00

## 2019-10-14 RX ADMIN — Medication 650 MILLIGRAM(S): at 22:59

## 2019-10-14 RX ADMIN — Medication 650 MILLIGRAM(S): at 14:00

## 2019-10-14 RX ADMIN — GABAPENTIN 400 MILLIGRAM(S): 400 CAPSULE ORAL at 21:04

## 2019-10-14 RX ADMIN — Medication 0.5 MILLIGRAM(S): at 13:16

## 2019-10-14 RX ADMIN — DULOXETINE HYDROCHLORIDE 20 MILLIGRAM(S): 30 CAPSULE, DELAYED RELEASE ORAL at 08:32

## 2019-10-14 RX ADMIN — LAMOTRIGINE 100 MILLIGRAM(S): 25 TABLET, ORALLY DISINTEGRATING ORAL at 08:33

## 2019-10-14 RX ADMIN — LOSARTAN POTASSIUM 50 MILLIGRAM(S): 100 TABLET, FILM COATED ORAL at 08:33

## 2019-10-14 RX ADMIN — Medication 1 DROP(S): at 21:04

## 2019-10-14 RX ADMIN — Medication 0.5 MILLIGRAM(S): at 21:04

## 2019-10-14 RX ADMIN — Medication 650 MILLIGRAM(S): at 13:17

## 2019-10-15 PROCEDURE — 99231 SBSQ HOSP IP/OBS SF/LOW 25: CPT

## 2019-10-15 PROCEDURE — 99233 SBSQ HOSP IP/OBS HIGH 50: CPT

## 2019-10-15 PROCEDURE — 73562 X-RAY EXAM OF KNEE 3: CPT | Mod: 26,LT

## 2019-10-15 RX ORDER — IBUPROFEN 200 MG
600 TABLET ORAL ONCE
Refills: 0 | Status: COMPLETED | OUTPATIENT
Start: 2019-10-15 | End: 2019-10-15

## 2019-10-15 RX ORDER — LIDOCAINE 4 G/100G
1 CREAM TOPICAL EVERY 24 HOURS
Refills: 0 | Status: DISCONTINUED | OUTPATIENT
Start: 2019-10-15 | End: 2019-10-23

## 2019-10-15 RX ORDER — IBUPROFEN 200 MG
600 TABLET ORAL EVERY 8 HOURS
Refills: 0 | Status: DISCONTINUED | OUTPATIENT
Start: 2019-10-15 | End: 2019-10-15

## 2019-10-15 RX ORDER — CLONAZEPAM 1 MG
0.5 TABLET ORAL THREE TIMES A DAY
Refills: 0 | Status: DISCONTINUED | OUTPATIENT
Start: 2019-10-15 | End: 2019-10-17

## 2019-10-15 RX ORDER — DULOXETINE HYDROCHLORIDE 30 MG/1
60 CAPSULE, DELAYED RELEASE ORAL DAILY
Refills: 0 | Status: DISCONTINUED | OUTPATIENT
Start: 2019-10-15 | End: 2019-10-16

## 2019-10-15 RX ADMIN — Medication 0.5 MILLIGRAM(S): at 08:37

## 2019-10-15 RX ADMIN — Medication 0.5 MILLIGRAM(S): at 12:34

## 2019-10-15 RX ADMIN — GABAPENTIN 400 MILLIGRAM(S): 400 CAPSULE ORAL at 08:38

## 2019-10-15 RX ADMIN — Medication 600 MILLIGRAM(S): at 02:10

## 2019-10-15 RX ADMIN — GABAPENTIN 400 MILLIGRAM(S): 400 CAPSULE ORAL at 12:34

## 2019-10-15 RX ADMIN — Medication 650 MILLIGRAM(S): at 21:39

## 2019-10-15 RX ADMIN — Medication 0.5 MILLIGRAM(S): at 20:06

## 2019-10-15 RX ADMIN — Medication 650 MILLIGRAM(S): at 08:55

## 2019-10-15 RX ADMIN — LIDOCAINE 1 PATCH: 4 CREAM TOPICAL at 21:39

## 2019-10-15 RX ADMIN — CELECOXIB 200 MILLIGRAM(S): 200 CAPSULE ORAL at 09:55

## 2019-10-15 RX ADMIN — GABAPENTIN 400 MILLIGRAM(S): 400 CAPSULE ORAL at 20:06

## 2019-10-15 RX ADMIN — CELECOXIB 200 MILLIGRAM(S): 200 CAPSULE ORAL at 08:37

## 2019-10-15 RX ADMIN — Medication 600 MILLIGRAM(S): at 02:37

## 2019-10-15 RX ADMIN — Medication 650 MILLIGRAM(S): at 20:05

## 2019-10-15 RX ADMIN — Medication 650 MILLIGRAM(S): at 07:35

## 2019-10-15 RX ADMIN — DULOXETINE HYDROCHLORIDE 20 MILLIGRAM(S): 30 CAPSULE, DELAYED RELEASE ORAL at 08:37

## 2019-10-15 RX ADMIN — LAMOTRIGINE 100 MILLIGRAM(S): 25 TABLET, ORALLY DISINTEGRATING ORAL at 08:38

## 2019-10-15 NOTE — PROGRESS NOTE ADULT - SUBJECTIVE AND OBJECTIVE BOX
CC/Reason for Consult: Acute on chronic left knee pain     SUBJECTIVE / OVERNIGHT EVENTS: Patient reports left knee pain first began around a month ago, when he fell. He underwent an MRI of the left knee. He is followed by outside orthopedic physician. His pain was being managed with Tyenol/Nsaids/gabapentin/ lidocaine patch. He reports improvement and near resolution of left knee pain, however, reports falling in the bathroom yesterday. Additionally, reports losing balance and "landing on his left knee in  weird way".     MEDICATIONS  (STANDING):  celecoxib 200 milliGRAM(s) Oral daily  clonazePAM  Tablet 0.5 milliGRAM(s) Oral three times a day  DULoxetine 20 milliGRAM(s) Oral daily  gabapentin 400 milliGRAM(s) Oral three times a day  lamoTRIgine 100 milliGRAM(s) Oral daily  losartan 50 milliGRAM(s) Oral daily  Vraylar 1.5 mg 3 milliGRAM(s) 3 milliGRAM(s) Oral daily    MEDICATIONS  (PRN):  acetaminophen   Tablet .. 650 milliGRAM(s) Oral every 6 hours PRN Mild Pain (1 - 3), Moderate Pain (4 - 6)  artificial  tears Solution 1 Drop(s) Both EYES four times a day PRN Dry Eyes  LORazepam     Tablet 1 milliGRAM(s) Oral every 6 hours PRN anxiety      Vital Signs Last 24 Hrs  T(C): 36.8 (15 Oct 2019 08:52), Max: 36.8 (15 Oct 2019 08:52)  T(F): 98.2 (15 Oct 2019 08:52), Max: 98.2 (15 Oct 2019 08:52)  HR: --  BP: --  BP(mean): --  RR: --  SpO2: --  CAPILLARY BLOOD GLUCOSE            PHYSICAL EXAM:  GENERAL: Middle age male in NAD, well-developed  HEAD:  Atraumatic, Normocephalic  EYES: EOMI, conjunctiva and sclera clear  NECK: Supple, No JVD  CHEST/LUNG: Clear to auscultation bilaterally; No wheeze  HEART: Regular rate and rhythm; No murmurs, rubs, or gallops  ABDOMEN: Soft, Nontender, Nondistended; Bowel sounds present  EXTREMITIES:  2+ Peripheral Pulses, Left  knee, no erythema, minimal swelling, tenderness to palpation along the lateral knee joint  PSYCH: AAOx3  NEUROLOGY: non-focal  SKIN: No rashes or lesions; healing scar mid back lumbar region (visual inspection only)    LABS:                    RADIOLOGY & ADDITIONAL TESTS:    Imaging Personally Reviewed:  Pt. OSH MRI on 9//8/2019   - Left knee   - Complex tear of the lateral meniscuc, subchondral frcture in the posterior lateral tibial plateu, OA, Large knee effusion  Consultant(s) Notes Reviewed:      Care Discussed with Consultants/Other Providers: Discussed imaging with primary psych. team Dr. Chavez. Furthermore, discussed patient's underlying psych diagnosis and how it may impact history taken.

## 2019-10-16 PROCEDURE — 99231 SBSQ HOSP IP/OBS SF/LOW 25: CPT

## 2019-10-16 PROCEDURE — 99232 SBSQ HOSP IP/OBS MODERATE 35: CPT

## 2019-10-16 RX ORDER — DULOXETINE HYDROCHLORIDE 30 MG/1
20 CAPSULE, DELAYED RELEASE ORAL DAILY
Refills: 0 | Status: DISCONTINUED | OUTPATIENT
Start: 2019-10-16 | End: 2019-10-23

## 2019-10-16 RX ORDER — LIDOCAINE 4 G/100G
1 CREAM TOPICAL ONCE
Refills: 0 | Status: COMPLETED | OUTPATIENT
Start: 2019-10-16 | End: 2019-10-16

## 2019-10-16 RX ADMIN — DULOXETINE HYDROCHLORIDE 60 MILLIGRAM(S): 30 CAPSULE, DELAYED RELEASE ORAL at 09:11

## 2019-10-16 RX ADMIN — Medication 650 MILLIGRAM(S): at 03:45

## 2019-10-16 RX ADMIN — Medication 650 MILLIGRAM(S): at 14:30

## 2019-10-16 RX ADMIN — LIDOCAINE 1 PATCH: 4 CREAM TOPICAL at 12:00

## 2019-10-16 RX ADMIN — Medication 650 MILLIGRAM(S): at 23:00

## 2019-10-16 RX ADMIN — LAMOTRIGINE 100 MILLIGRAM(S): 25 TABLET, ORALLY DISINTEGRATING ORAL at 09:10

## 2019-10-16 RX ADMIN — CELECOXIB 200 MILLIGRAM(S): 200 CAPSULE ORAL at 10:24

## 2019-10-16 RX ADMIN — Medication 650 MILLIGRAM(S): at 22:30

## 2019-10-16 RX ADMIN — Medication 0.5 MILLIGRAM(S): at 21:39

## 2019-10-16 RX ADMIN — Medication 650 MILLIGRAM(S): at 03:42

## 2019-10-16 RX ADMIN — LIDOCAINE 1 PATCH: 4 CREAM TOPICAL at 16:46

## 2019-10-16 RX ADMIN — GABAPENTIN 400 MILLIGRAM(S): 400 CAPSULE ORAL at 09:10

## 2019-10-16 RX ADMIN — GABAPENTIN 400 MILLIGRAM(S): 400 CAPSULE ORAL at 12:07

## 2019-10-16 RX ADMIN — Medication 0.5 MILLIGRAM(S): at 09:11

## 2019-10-16 RX ADMIN — Medication 650 MILLIGRAM(S): at 15:54

## 2019-10-16 RX ADMIN — LIDOCAINE 1 PATCH: 4 CREAM TOPICAL at 18:54

## 2019-10-16 RX ADMIN — Medication 0.5 MILLIGRAM(S): at 12:28

## 2019-10-16 RX ADMIN — GABAPENTIN 400 MILLIGRAM(S): 400 CAPSULE ORAL at 21:39

## 2019-10-16 RX ADMIN — CELECOXIB 200 MILLIGRAM(S): 200 CAPSULE ORAL at 09:11

## 2019-10-16 RX ADMIN — LIDOCAINE 1 PATCH: 4 CREAM TOPICAL at 05:58

## 2019-10-16 NOTE — PROGRESS NOTE ADULT - SUBJECTIVE AND OBJECTIVE BOX
CC/Reason for Consult: acute knee pain     SUBJECTIVE / OVERNIGHT EVENTS: Yesterday, pt. underwent left knee xray, which was unremarkable. This am, patient assessed at bedside. Noted to have improvement in left knee pain with lidocaine patch. Prefers lower dose of cymbalta. Discussed options of increasing cymbalta vs. increasing gabapentin. Pt. declined     MEDICATIONS  (STANDING):  celecoxib 200 milliGRAM(s) Oral daily  clonazePAM  Tablet 0.5 milliGRAM(s) Oral three times a day  DULoxetine 20 milliGRAM(s) Oral daily  gabapentin 400 milliGRAM(s) Oral three times a day  lamoTRIgine 100 milliGRAM(s) Oral daily  lidocaine   Patch 1 Patch Transdermal every 24 hours  losartan 50 milliGRAM(s) Oral daily  Vraylar 1.5 mg 3 milliGRAM(s) 3 milliGRAM(s) Oral daily    MEDICATIONS  (PRN):  acetaminophen   Tablet .. 650 milliGRAM(s) Oral every 6 hours PRN Mild Pain (1 - 3), Moderate Pain (4 - 6)  artificial  tears Solution 1 Drop(s) Both EYES four times a day PRN Dry Eyes  LORazepam     Tablet 1 milliGRAM(s) Oral every 6 hours PRN anxiety      Vital Signs Last 24 Hrs  T(C): 36.6 (16 Oct 2019 06:46), Max: 36.6 (16 Oct 2019 06:46)  T(F): 97.8 (16 Oct 2019 06:46), Max: 97.8 (16 Oct 2019 06:46)  HR: --  BP: --  BP(mean): --  RR: --  SpO2: --  CAPILLARY BLOOD GLUCOSE            PHYSICAL EXAM:  GENERAL: Middle age male in NAD, well-developed  HEAD:  Atraumatic, Normocephalic  EYES: EOMI, conjunctiva and sclera clear  NECK: Supple, No JVD  CHEST/LUNG: Clear to auscultation bilaterally; No wheeze  HEART: Regular rate and rhythm; No murmurs, rubs, or gallops  ABDOMEN: Soft, Nontender, Nondistended; Bowel sounds present  EXTREMITIES:  2+ Peripheral Pulses, No clubbing, cyanosis, or edema; left knee without swelling erythema. Left lateral side tender to touch   PSYCH: slightly agitated, answering questions appropriately   NEUROLOGY: non-focal  SKIN: No rashes or lesions    LABS:                    RADIOLOGY & ADDITIONAL TESTS:    Imaging Personally Reviewed:< from: Xray Knee 3 Views, Left (10.15.19 @ 15:25) >  IMPRESSION:  No fracture, dislocation, or joint effusion.    Preserved joint spaces with smooth and intact articular surfaces. No   joint margin erosions or intra-articular or periarticular calcifications.    Unremarkable quadriceps and patellar tendon shadows.     Fabella present adjacent to the lateral femoral condyle.    No destructive osseous lesions or periosteal reaction.    < end of copied text >      Consultant(s) Notes Reviewed:      Care Discussed with Consultants/Other Providers:

## 2019-10-16 NOTE — PROGRESS NOTE ADULT - ASSESSMENT
65 y/o M with PMH of HTN, depression, anxiety, chronic back pain who presented to [facility] after overdosing on 90 1mg Klonopin pills seen for post acute care follow up    # Overdose - s/p ingestion of Klonopin Denies it to be a suicide attempt. Labs from [facility] reviewed and largely unremarkable. EKG unremarkable.   # Chronic pain - multiple musculoskeletal complaints. Now with acute on chronic left knee pain i/s/o reportedly fall.   -  Istop performed reference #[id number]. Patient is on cannabis drops(luxlyte and curaleaf) (last dispensed 9/25).   - Patient is currently on  multimodal pain management with Neurontin tylenol. NSAIDS (normal renal function), lidocaine patch and cymbalta. Will decrease cymbalta to 20mg   - Left knee xray - 10/15 without any joint effusion or fracture . On discharge from [facility], patient should follow up with orthopedic specialist. Avoid narcotics give abuse potential in this patient.   #HTN - c/w losartan. Will monitor BP and titrate as needed.   #Depression and anxiety - management per psych  #Polycythemia - Hgb slightly elevated (17.1) however records from [facility] reviewed and it was normal during his hospitalization. Patient admits to being smoker "quit 2 weeks ago". Will continue to encourage oral hydration. Will order another CBC for Oct 15th if patient is still here. Otherwise patient can follow as an outpatient.

## 2019-10-17 PROCEDURE — 99231 SBSQ HOSP IP/OBS SF/LOW 25: CPT

## 2019-10-17 PROCEDURE — 90853 GROUP PSYCHOTHERAPY: CPT

## 2019-10-17 RX ORDER — CLONAZEPAM 1 MG
0.5 TABLET ORAL
Refills: 0 | Status: DISCONTINUED | OUTPATIENT
Start: 2019-10-17 | End: 2019-10-18

## 2019-10-17 RX ADMIN — CELECOXIB 200 MILLIGRAM(S): 200 CAPSULE ORAL at 09:05

## 2019-10-17 RX ADMIN — Medication 0.5 MILLIGRAM(S): at 20:12

## 2019-10-17 RX ADMIN — GABAPENTIN 400 MILLIGRAM(S): 400 CAPSULE ORAL at 09:06

## 2019-10-17 RX ADMIN — Medication 650 MILLIGRAM(S): at 22:40

## 2019-10-17 RX ADMIN — DULOXETINE HYDROCHLORIDE 20 MILLIGRAM(S): 30 CAPSULE, DELAYED RELEASE ORAL at 09:06

## 2019-10-17 RX ADMIN — Medication 650 MILLIGRAM(S): at 11:04

## 2019-10-17 RX ADMIN — Medication 650 MILLIGRAM(S): at 09:06

## 2019-10-17 RX ADMIN — Medication 1 DROP(S): at 13:03

## 2019-10-17 RX ADMIN — Medication 0.5 MILLIGRAM(S): at 09:05

## 2019-10-17 RX ADMIN — Medication 650 MILLIGRAM(S): at 22:10

## 2019-10-17 RX ADMIN — LAMOTRIGINE 100 MILLIGRAM(S): 25 TABLET, ORALLY DISINTEGRATING ORAL at 09:06

## 2019-10-17 RX ADMIN — GABAPENTIN 400 MILLIGRAM(S): 400 CAPSULE ORAL at 20:12

## 2019-10-17 RX ADMIN — GABAPENTIN 400 MILLIGRAM(S): 400 CAPSULE ORAL at 13:04

## 2019-10-17 RX ADMIN — LIDOCAINE 1 PATCH: 4 CREAM TOPICAL at 20:12

## 2019-10-18 LAB
BASOPHILS # BLD AUTO: 0.03 K/UL — SIGNIFICANT CHANGE UP (ref 0–0.2)
BASOPHILS NFR BLD AUTO: 0.5 % — SIGNIFICANT CHANGE UP (ref 0–2)
EOSINOPHIL # BLD AUTO: 0.07 K/UL — SIGNIFICANT CHANGE UP (ref 0–0.5)
EOSINOPHIL NFR BLD AUTO: 1.1 % — SIGNIFICANT CHANGE UP (ref 0–6)
HCT VFR BLD CALC: 50.2 % — HIGH (ref 39–50)
HGB BLD-MCNC: 16.7 G/DL — SIGNIFICANT CHANGE UP (ref 13–17)
IMM GRANULOCYTES NFR BLD AUTO: 0.3 % — SIGNIFICANT CHANGE UP (ref 0–1.5)
LYMPHOCYTES # BLD AUTO: 1.6 K/UL — SIGNIFICANT CHANGE UP (ref 1–3.3)
LYMPHOCYTES # BLD AUTO: 25.3 % — SIGNIFICANT CHANGE UP (ref 13–44)
MCHC RBC-ENTMCNC: 30.5 PG — SIGNIFICANT CHANGE UP (ref 27–34)
MCHC RBC-ENTMCNC: 33.3 % — SIGNIFICANT CHANGE UP (ref 32–36)
MCV RBC AUTO: 91.8 FL — SIGNIFICANT CHANGE UP (ref 80–100)
MONOCYTES # BLD AUTO: 0.33 K/UL — SIGNIFICANT CHANGE UP (ref 0–0.9)
MONOCYTES NFR BLD AUTO: 5.2 % — SIGNIFICANT CHANGE UP (ref 2–14)
NEUTROPHILS # BLD AUTO: 4.28 K/UL — SIGNIFICANT CHANGE UP (ref 1.8–7.4)
NEUTROPHILS NFR BLD AUTO: 67.6 % — SIGNIFICANT CHANGE UP (ref 43–77)
NRBC # FLD: 0 K/UL — SIGNIFICANT CHANGE UP (ref 0–0)
PLATELET # BLD AUTO: 242 K/UL — SIGNIFICANT CHANGE UP (ref 150–400)
PMV BLD: 9.5 FL — SIGNIFICANT CHANGE UP (ref 7–13)
RBC # BLD: 5.47 M/UL — SIGNIFICANT CHANGE UP (ref 4.2–5.8)
RBC # FLD: 13.3 % — SIGNIFICANT CHANGE UP (ref 10.3–14.5)
WBC # BLD: 6.33 K/UL — SIGNIFICANT CHANGE UP (ref 3.8–10.5)
WBC # FLD AUTO: 6.33 K/UL — SIGNIFICANT CHANGE UP (ref 3.8–10.5)

## 2019-10-18 PROCEDURE — 99231 SBSQ HOSP IP/OBS SF/LOW 25: CPT

## 2019-10-18 RX ADMIN — LAMOTRIGINE 100 MILLIGRAM(S): 25 TABLET, ORALLY DISINTEGRATING ORAL at 09:55

## 2019-10-18 RX ADMIN — CELECOXIB 200 MILLIGRAM(S): 200 CAPSULE ORAL at 09:54

## 2019-10-18 RX ADMIN — Medication 650 MILLIGRAM(S): at 21:25

## 2019-10-18 RX ADMIN — Medication 1 DROP(S): at 14:27

## 2019-10-18 RX ADMIN — GABAPENTIN 400 MILLIGRAM(S): 400 CAPSULE ORAL at 09:54

## 2019-10-18 RX ADMIN — GABAPENTIN 400 MILLIGRAM(S): 400 CAPSULE ORAL at 20:13

## 2019-10-18 RX ADMIN — DULOXETINE HYDROCHLORIDE 20 MILLIGRAM(S): 30 CAPSULE, DELAYED RELEASE ORAL at 09:54

## 2019-10-18 RX ADMIN — Medication 650 MILLIGRAM(S): at 20:10

## 2019-10-18 RX ADMIN — GABAPENTIN 400 MILLIGRAM(S): 400 CAPSULE ORAL at 14:18

## 2019-10-18 RX ADMIN — LIDOCAINE 1 PATCH: 4 CREAM TOPICAL at 20:13

## 2019-10-18 RX ADMIN — Medication 1 DROP(S): at 20:14

## 2019-10-18 RX ADMIN — Medication 650 MILLIGRAM(S): at 08:55

## 2019-10-18 RX ADMIN — Medication 0.5 MILLIGRAM(S): at 09:54

## 2019-10-19 RX ADMIN — LAMOTRIGINE 100 MILLIGRAM(S): 25 TABLET, ORALLY DISINTEGRATING ORAL at 08:35

## 2019-10-19 RX ADMIN — GABAPENTIN 400 MILLIGRAM(S): 400 CAPSULE ORAL at 12:32

## 2019-10-19 RX ADMIN — GABAPENTIN 400 MILLIGRAM(S): 400 CAPSULE ORAL at 08:35

## 2019-10-19 RX ADMIN — Medication 1 DROP(S): at 20:17

## 2019-10-19 RX ADMIN — CELECOXIB 200 MILLIGRAM(S): 200 CAPSULE ORAL at 09:55

## 2019-10-19 RX ADMIN — GABAPENTIN 400 MILLIGRAM(S): 400 CAPSULE ORAL at 20:17

## 2019-10-19 RX ADMIN — LIDOCAINE 1 PATCH: 4 CREAM TOPICAL at 20:17

## 2019-10-19 RX ADMIN — Medication 650 MILLIGRAM(S): at 06:45

## 2019-10-19 RX ADMIN — Medication 1 DROP(S): at 15:55

## 2019-10-19 RX ADMIN — Medication 650 MILLIGRAM(S): at 15:55

## 2019-10-19 RX ADMIN — LIDOCAINE 1 PATCH: 4 CREAM TOPICAL at 09:00

## 2019-10-19 RX ADMIN — DULOXETINE HYDROCHLORIDE 20 MILLIGRAM(S): 30 CAPSULE, DELAYED RELEASE ORAL at 08:35

## 2019-10-19 RX ADMIN — CELECOXIB 200 MILLIGRAM(S): 200 CAPSULE ORAL at 08:35

## 2019-10-19 RX ADMIN — Medication 650 MILLIGRAM(S): at 17:00

## 2019-10-20 RX ADMIN — CELECOXIB 200 MILLIGRAM(S): 200 CAPSULE ORAL at 09:30

## 2019-10-20 RX ADMIN — GABAPENTIN 400 MILLIGRAM(S): 400 CAPSULE ORAL at 08:54

## 2019-10-20 RX ADMIN — Medication 650 MILLIGRAM(S): at 21:33

## 2019-10-20 RX ADMIN — DULOXETINE HYDROCHLORIDE 20 MILLIGRAM(S): 30 CAPSULE, DELAYED RELEASE ORAL at 08:54

## 2019-10-20 RX ADMIN — GABAPENTIN 400 MILLIGRAM(S): 400 CAPSULE ORAL at 12:24

## 2019-10-20 RX ADMIN — Medication 650 MILLIGRAM(S): at 10:26

## 2019-10-20 RX ADMIN — Medication 650 MILLIGRAM(S): at 03:53

## 2019-10-20 RX ADMIN — LIDOCAINE 1 PATCH: 4 CREAM TOPICAL at 20:13

## 2019-10-20 RX ADMIN — LIDOCAINE 1 PATCH: 4 CREAM TOPICAL at 08:00

## 2019-10-20 RX ADMIN — GABAPENTIN 400 MILLIGRAM(S): 400 CAPSULE ORAL at 20:13

## 2019-10-20 RX ADMIN — Medication 650 MILLIGRAM(S): at 06:19

## 2019-10-20 RX ADMIN — CELECOXIB 200 MILLIGRAM(S): 200 CAPSULE ORAL at 08:54

## 2019-10-20 RX ADMIN — Medication 650 MILLIGRAM(S): at 11:00

## 2019-10-20 RX ADMIN — Medication 1 DROP(S): at 21:33

## 2019-10-20 RX ADMIN — LAMOTRIGINE 100 MILLIGRAM(S): 25 TABLET, ORALLY DISINTEGRATING ORAL at 08:54

## 2019-10-21 PROCEDURE — 99231 SBSQ HOSP IP/OBS SF/LOW 25: CPT

## 2019-10-21 RX ADMIN — Medication 650 MILLIGRAM(S): at 21:00

## 2019-10-21 RX ADMIN — Medication 650 MILLIGRAM(S): at 22:00

## 2019-10-21 RX ADMIN — GABAPENTIN 400 MILLIGRAM(S): 400 CAPSULE ORAL at 08:58

## 2019-10-21 RX ADMIN — Medication 1 DROP(S): at 21:00

## 2019-10-21 RX ADMIN — LIDOCAINE 1 PATCH: 4 CREAM TOPICAL at 07:39

## 2019-10-21 RX ADMIN — LAMOTRIGINE 100 MILLIGRAM(S): 25 TABLET, ORALLY DISINTEGRATING ORAL at 08:58

## 2019-10-21 RX ADMIN — Medication 650 MILLIGRAM(S): at 12:15

## 2019-10-21 RX ADMIN — LOSARTAN POTASSIUM 50 MILLIGRAM(S): 100 TABLET, FILM COATED ORAL at 08:58

## 2019-10-21 RX ADMIN — LIDOCAINE 1 PATCH: 4 CREAM TOPICAL at 21:10

## 2019-10-21 RX ADMIN — DULOXETINE HYDROCHLORIDE 20 MILLIGRAM(S): 30 CAPSULE, DELAYED RELEASE ORAL at 08:58

## 2019-10-21 RX ADMIN — LIDOCAINE 1 PATCH: 4 CREAM TOPICAL at 08:59

## 2019-10-21 RX ADMIN — Medication 650 MILLIGRAM(S): at 11:40

## 2019-10-21 RX ADMIN — GABAPENTIN 400 MILLIGRAM(S): 400 CAPSULE ORAL at 21:09

## 2019-10-21 RX ADMIN — CELECOXIB 200 MILLIGRAM(S): 200 CAPSULE ORAL at 08:58

## 2019-10-21 RX ADMIN — CELECOXIB 200 MILLIGRAM(S): 200 CAPSULE ORAL at 09:33

## 2019-10-21 RX ADMIN — Medication 650 MILLIGRAM(S): at 00:25

## 2019-10-21 RX ADMIN — GABAPENTIN 400 MILLIGRAM(S): 400 CAPSULE ORAL at 12:56

## 2019-10-22 PROCEDURE — 99231 SBSQ HOSP IP/OBS SF/LOW 25: CPT

## 2019-10-22 RX ORDER — LIDOCAINE 4 G/100G
1 CREAM TOPICAL
Qty: 7 | Refills: 0
Start: 2019-10-22 | End: 2019-10-28

## 2019-10-22 RX ORDER — CARIPRAZINE 1.5 MG/1
1 CAPSULE, GELATIN COATED ORAL
Qty: 30 | Refills: 0
Start: 2019-10-22 | End: 2019-11-20

## 2019-10-22 RX ORDER — CELECOXIB 200 MG/1
1 CAPSULE ORAL
Qty: 14 | Refills: 0
Start: 2019-10-22 | End: 2019-11-04

## 2019-10-22 RX ORDER — LOSARTAN POTASSIUM 100 MG/1
1 TABLET, FILM COATED ORAL
Qty: 0 | Refills: 0 | DISCHARGE

## 2019-10-22 RX ORDER — LAMOTRIGINE 25 MG/1
1 TABLET, ORALLY DISINTEGRATING ORAL
Qty: 0 | Refills: 0 | DISCHARGE

## 2019-10-22 RX ORDER — LOSARTAN POTASSIUM 100 MG/1
1 TABLET, FILM COATED ORAL
Qty: 0 | Refills: 0 | DISCHARGE
Start: 2019-10-22

## 2019-10-22 RX ORDER — DULOXETINE HYDROCHLORIDE 30 MG/1
1 CAPSULE, DELAYED RELEASE ORAL
Qty: 0 | Refills: 0 | DISCHARGE
Start: 2019-10-22

## 2019-10-22 RX ORDER — GABAPENTIN 400 MG/1
1 CAPSULE ORAL
Qty: 42 | Refills: 0
Start: 2019-10-22 | End: 2019-11-04

## 2019-10-22 RX ORDER — LAMOTRIGINE 25 MG/1
1 TABLET, ORALLY DISINTEGRATING ORAL
Qty: 0 | Refills: 0 | DISCHARGE
Start: 2019-10-22

## 2019-10-22 RX ORDER — DULOXETINE HYDROCHLORIDE 30 MG/1
1 CAPSULE, DELAYED RELEASE ORAL
Qty: 0 | Refills: 0 | DISCHARGE

## 2019-10-22 RX ADMIN — CELECOXIB 200 MILLIGRAM(S): 200 CAPSULE ORAL at 09:48

## 2019-10-22 RX ADMIN — GABAPENTIN 400 MILLIGRAM(S): 400 CAPSULE ORAL at 09:48

## 2019-10-22 RX ADMIN — LAMOTRIGINE 100 MILLIGRAM(S): 25 TABLET, ORALLY DISINTEGRATING ORAL at 09:48

## 2019-10-22 RX ADMIN — Medication 650 MILLIGRAM(S): at 21:59

## 2019-10-22 RX ADMIN — DULOXETINE HYDROCHLORIDE 20 MILLIGRAM(S): 30 CAPSULE, DELAYED RELEASE ORAL at 09:48

## 2019-10-22 RX ADMIN — Medication 650 MILLIGRAM(S): at 20:30

## 2019-10-22 RX ADMIN — GABAPENTIN 400 MILLIGRAM(S): 400 CAPSULE ORAL at 21:59

## 2019-10-22 RX ADMIN — GABAPENTIN 400 MILLIGRAM(S): 400 CAPSULE ORAL at 12:57

## 2019-10-22 RX ADMIN — LIDOCAINE 1 PATCH: 4 CREAM TOPICAL at 06:56

## 2019-10-22 RX ADMIN — LOSARTAN POTASSIUM 50 MILLIGRAM(S): 100 TABLET, FILM COATED ORAL at 09:49

## 2019-10-22 RX ADMIN — LIDOCAINE 1 PATCH: 4 CREAM TOPICAL at 21:59

## 2019-10-22 RX ADMIN — Medication 650 MILLIGRAM(S): at 05:15

## 2019-10-23 VITALS — TEMPERATURE: 98 F

## 2019-10-23 PROCEDURE — 99238 HOSP IP/OBS DSCHRG MGMT 30/<: CPT

## 2019-10-23 RX ADMIN — LOSARTAN POTASSIUM 50 MILLIGRAM(S): 100 TABLET, FILM COATED ORAL at 09:14

## 2019-10-23 RX ADMIN — GABAPENTIN 400 MILLIGRAM(S): 400 CAPSULE ORAL at 09:14

## 2019-10-23 RX ADMIN — Medication 650 MILLIGRAM(S): at 04:10

## 2019-10-23 RX ADMIN — LAMOTRIGINE 100 MILLIGRAM(S): 25 TABLET, ORALLY DISINTEGRATING ORAL at 09:14

## 2019-10-23 RX ADMIN — Medication 650 MILLIGRAM(S): at 03:27

## 2019-10-23 RX ADMIN — CELECOXIB 200 MILLIGRAM(S): 200 CAPSULE ORAL at 09:14

## 2019-10-23 RX ADMIN — DULOXETINE HYDROCHLORIDE 20 MILLIGRAM(S): 30 CAPSULE, DELAYED RELEASE ORAL at 09:14

## 2019-10-30 ENCOUNTER — OUTPATIENT (OUTPATIENT)
Dept: OUTPATIENT SERVICES | Facility: HOSPITAL | Age: 64
LOS: 1 days | Discharge: HOME | End: 2019-10-30
Payer: MEDICARE

## 2019-10-30 DIAGNOSIS — Z98.890 OTHER SPECIFIED POSTPROCEDURAL STATES: Chronic | ICD-10-CM

## 2019-10-30 DIAGNOSIS — Z90.49 ACQUIRED ABSENCE OF OTHER SPECIFIED PARTS OF DIGESTIVE TRACT: Chronic | ICD-10-CM

## 2019-10-30 PROCEDURE — 90792 PSYCH DIAG EVAL W/MED SRVCS: CPT

## 2019-10-31 ENCOUNTER — OUTPATIENT (OUTPATIENT)
Dept: OUTPATIENT SERVICES | Facility: HOSPITAL | Age: 64
LOS: 1 days | Discharge: HOME | End: 2019-10-31

## 2019-10-31 DIAGNOSIS — Z90.49 ACQUIRED ABSENCE OF OTHER SPECIFIED PARTS OF DIGESTIVE TRACT: Chronic | ICD-10-CM

## 2019-10-31 DIAGNOSIS — F31.12 BIPOLAR DISORDER, CURRENT EPISODE MANIC WITHOUT PSYCHOTIC FEATURES, MODERATE: ICD-10-CM

## 2019-10-31 DIAGNOSIS — Z98.890 OTHER SPECIFIED POSTPROCEDURAL STATES: Chronic | ICD-10-CM

## 2019-10-31 DIAGNOSIS — F10.20 ALCOHOL DEPENDENCE, UNCOMPLICATED: ICD-10-CM

## 2019-11-01 DIAGNOSIS — F10.20 ALCOHOL DEPENDENCE, UNCOMPLICATED: ICD-10-CM

## 2019-11-13 ENCOUNTER — APPOINTMENT (OUTPATIENT)
Dept: ORTHOPEDIC SURGERY | Facility: HOSPITAL | Age: 64
End: 2019-11-13

## 2019-11-27 ENCOUNTER — OUTPATIENT (OUTPATIENT)
Dept: OUTPATIENT SERVICES | Facility: HOSPITAL | Age: 64
LOS: 1 days | Discharge: HOME | End: 2019-11-27
Payer: MEDICARE

## 2019-11-27 DIAGNOSIS — Z98.890 OTHER SPECIFIED POSTPROCEDURAL STATES: Chronic | ICD-10-CM

## 2019-11-27 DIAGNOSIS — Z90.49 ACQUIRED ABSENCE OF OTHER SPECIFIED PARTS OF DIGESTIVE TRACT: Chronic | ICD-10-CM

## 2019-12-12 ENCOUNTER — OUTPATIENT (OUTPATIENT)
Dept: OUTPATIENT SERVICES | Facility: HOSPITAL | Age: 64
LOS: 1 days | Discharge: HOME | End: 2019-12-12
Payer: MEDICARE

## 2019-12-12 DIAGNOSIS — Z98.890 OTHER SPECIFIED POSTPROCEDURAL STATES: Chronic | ICD-10-CM

## 2019-12-12 DIAGNOSIS — Z90.49 ACQUIRED ABSENCE OF OTHER SPECIFIED PARTS OF DIGESTIVE TRACT: Chronic | ICD-10-CM

## 2019-12-12 PROCEDURE — 90792 PSYCH DIAG EVAL W/MED SRVCS: CPT

## 2019-12-12 PROCEDURE — 90791 PSYCH DIAGNOSTIC EVALUATION: CPT

## 2020-01-22 DIAGNOSIS — F31.9 BIPOLAR DISORDER, UNSPECIFIED: ICD-10-CM

## 2020-01-28 PROCEDURE — 90870 ELECTROCONVULSIVE THERAPY: CPT

## 2020-01-28 RX ORDER — MIDAZOLAM HYDROCHLORIDE 1 MG/ML
2 INJECTION, SOLUTION INTRAMUSCULAR; INTRAVENOUS ONCE
Refills: 0 | Status: DISCONTINUED | OUTPATIENT
Start: 2020-01-28 | End: 2020-01-28

## 2020-01-30 PROCEDURE — 90870 ELECTROCONVULSIVE THERAPY: CPT

## 2020-02-03 PROCEDURE — 90870 ELECTROCONVULSIVE THERAPY: CPT

## 2020-02-05 PROCEDURE — 90870 ELECTROCONVULSIVE THERAPY: CPT

## 2020-02-11 PROCEDURE — 90870 ELECTROCONVULSIVE THERAPY: CPT

## 2020-02-13 PROCEDURE — 90870 ELECTROCONVULSIVE THERAPY: CPT

## 2020-02-18 PROCEDURE — 90870 ELECTROCONVULSIVE THERAPY: CPT

## 2020-02-19 DIAGNOSIS — F31.4 BIPOLAR DISORDER, CURRENT EPISODE DEPRESSED, SEVERE, WITHOUT PSYCHOTIC FEATURES: ICD-10-CM

## 2020-02-20 PROCEDURE — 90870 ELECTROCONVULSIVE THERAPY: CPT

## 2020-02-25 PROCEDURE — 90870 ELECTROCONVULSIVE THERAPY: CPT

## 2020-02-27 PROCEDURE — 90870 ELECTROCONVULSIVE THERAPY: CPT

## 2020-03-03 PROCEDURE — 90870 ELECTROCONVULSIVE THERAPY: CPT

## 2020-03-10 PROCEDURE — 90870 ELECTROCONVULSIVE THERAPY: CPT

## 2020-06-11 PROCEDURE — 90870 ELECTROCONVULSIVE THERAPY: CPT

## 2020-06-16 PROCEDURE — 90870 ELECTROCONVULSIVE THERAPY: CPT

## 2020-06-18 PROCEDURE — 90870 ELECTROCONVULSIVE THERAPY: CPT

## 2020-06-22 LAB — SARS-COV-2 RNA SPEC QL NAA+PROBE: SIGNIFICANT CHANGE UP

## 2020-06-24 PROCEDURE — 90870 ELECTROCONVULSIVE THERAPY: CPT

## 2020-07-01 LAB
ALBUMIN SERPL ELPH-MCNC: 4.4 G/DL — SIGNIFICANT CHANGE UP (ref 3.3–5)
ALP SERPL-CCNC: 45 U/L — SIGNIFICANT CHANGE UP (ref 40–120)
ALT FLD-CCNC: 35 U/L — SIGNIFICANT CHANGE UP (ref 4–41)
ANION GAP SERPL CALC-SCNC: 12 MMO/L — SIGNIFICANT CHANGE UP (ref 7–14)
AST SERPL-CCNC: 27 U/L — SIGNIFICANT CHANGE UP (ref 4–40)
BASOPHILS # BLD AUTO: 0.06 K/UL — SIGNIFICANT CHANGE UP (ref 0–0.2)
BASOPHILS NFR BLD AUTO: 0.6 % — SIGNIFICANT CHANGE UP (ref 0–2)
BILIRUB SERPL-MCNC: 0.3 MG/DL — SIGNIFICANT CHANGE UP (ref 0.2–1.2)
BUN SERPL-MCNC: 21 MG/DL — SIGNIFICANT CHANGE UP (ref 7–23)
CALCIUM SERPL-MCNC: 9.1 MG/DL — SIGNIFICANT CHANGE UP (ref 8.4–10.5)
CHLORIDE SERPL-SCNC: 104 MMOL/L — SIGNIFICANT CHANGE UP (ref 98–107)
CHOLEST SERPL-MCNC: 214 MG/DL — HIGH (ref 120–199)
CO2 SERPL-SCNC: 22 MMOL/L — SIGNIFICANT CHANGE UP (ref 22–31)
CREAT SERPL-MCNC: 0.81 MG/DL — SIGNIFICANT CHANGE UP (ref 0.5–1.3)
EOSINOPHIL # BLD AUTO: 0.11 K/UL — SIGNIFICANT CHANGE UP (ref 0–0.5)
EOSINOPHIL NFR BLD AUTO: 1.1 % — SIGNIFICANT CHANGE UP (ref 0–6)
GLUCOSE SERPL-MCNC: 141 MG/DL — HIGH (ref 70–99)
HBA1C BLD-MCNC: 5.7 % — HIGH (ref 4–5.6)
HCT VFR BLD CALC: 48.4 % — SIGNIFICANT CHANGE UP (ref 39–50)
HDLC SERPL-MCNC: 52 MG/DL — SIGNIFICANT CHANGE UP (ref 35–55)
HGB BLD-MCNC: 16.4 G/DL — SIGNIFICANT CHANGE UP (ref 13–17)
IMM GRANULOCYTES NFR BLD AUTO: 1.2 % — SIGNIFICANT CHANGE UP (ref 0–1.5)
LIPID PNL WITH DIRECT LDL SERPL: 139 MG/DL — SIGNIFICANT CHANGE UP
LYMPHOCYTES # BLD AUTO: 2.71 K/UL — SIGNIFICANT CHANGE UP (ref 1–3.3)
LYMPHOCYTES # BLD AUTO: 28.1 % — SIGNIFICANT CHANGE UP (ref 13–44)
MCHC RBC-ENTMCNC: 31.1 PG — SIGNIFICANT CHANGE UP (ref 27–34)
MCHC RBC-ENTMCNC: 33.9 % — SIGNIFICANT CHANGE UP (ref 32–36)
MCV RBC AUTO: 91.8 FL — SIGNIFICANT CHANGE UP (ref 80–100)
MONOCYTES # BLD AUTO: 0.88 K/UL — SIGNIFICANT CHANGE UP (ref 0–0.9)
MONOCYTES NFR BLD AUTO: 9.1 % — SIGNIFICANT CHANGE UP (ref 2–14)
NEUTROPHILS # BLD AUTO: 5.76 K/UL — SIGNIFICANT CHANGE UP (ref 1.8–7.4)
NEUTROPHILS NFR BLD AUTO: 59.9 % — SIGNIFICANT CHANGE UP (ref 43–77)
NRBC # FLD: 0 K/UL — SIGNIFICANT CHANGE UP (ref 0–0)
PLATELET # BLD AUTO: 225 K/UL — SIGNIFICANT CHANGE UP (ref 150–400)
PMV BLD: 9.1 FL — SIGNIFICANT CHANGE UP (ref 7–13)
POTASSIUM SERPL-MCNC: 4.3 MMOL/L — SIGNIFICANT CHANGE UP (ref 3.5–5.3)
POTASSIUM SERPL-SCNC: 4.3 MMOL/L — SIGNIFICANT CHANGE UP (ref 3.5–5.3)
PROT SERPL-MCNC: 6.3 G/DL — SIGNIFICANT CHANGE UP (ref 6–8.3)
PSA FLD-MCNC: 1.49 NG/ML — SIGNIFICANT CHANGE UP (ref 0–4)
RBC # BLD: 5.27 M/UL — SIGNIFICANT CHANGE UP (ref 4.2–5.8)
RBC # FLD: 13.9 % — SIGNIFICANT CHANGE UP (ref 10.3–14.5)
SODIUM SERPL-SCNC: 138 MMOL/L — SIGNIFICANT CHANGE UP (ref 135–145)
TRIGL SERPL-MCNC: 205 MG/DL — HIGH (ref 10–149)
WBC # BLD: 9.64 K/UL — SIGNIFICANT CHANGE UP (ref 3.8–10.5)
WBC # FLD AUTO: 9.64 K/UL — SIGNIFICANT CHANGE UP (ref 3.8–10.5)

## 2020-07-01 PROCEDURE — 93010 ELECTROCARDIOGRAM REPORT: CPT

## 2020-07-06 LAB — SARS-COV-2 RNA SPEC QL NAA+PROBE: SIGNIFICANT CHANGE UP

## 2020-07-09 PROCEDURE — 90870 ELECTROCONVULSIVE THERAPY: CPT

## 2020-07-13 PROCEDURE — 90870 ELECTROCONVULSIVE THERAPY: CPT

## 2020-07-14 LAB — SARS-COV-2 RNA SPEC QL NAA+PROBE: SIGNIFICANT CHANGE UP

## 2020-07-16 PROCEDURE — 90870 ELECTROCONVULSIVE THERAPY: CPT

## 2020-07-20 PROCEDURE — 90870 ELECTROCONVULSIVE THERAPY: CPT

## 2020-07-21 LAB — SARS-COV-2 RNA SPEC QL NAA+PROBE: SIGNIFICANT CHANGE UP

## 2020-07-23 PROCEDURE — 90870 ELECTROCONVULSIVE THERAPY: CPT

## 2020-07-24 LAB — SARS-COV-2 RNA SPEC QL NAA+PROBE: SIGNIFICANT CHANGE UP

## 2020-07-28 PROCEDURE — 90870 ELECTROCONVULSIVE THERAPY: CPT

## 2020-07-30 PROCEDURE — 90870 ELECTROCONVULSIVE THERAPY: CPT

## 2020-08-20 PROBLEM — Z87.19 HISTORY OF CALCULUS OF GALLBLADDER: Status: RESOLVED | Noted: 2017-01-31 | Resolved: 2020-08-20

## 2020-08-27 ENCOUNTER — INPATIENT (INPATIENT)
Facility: HOSPITAL | Age: 65
LOS: 6 days | Discharge: HOME | End: 2020-09-03
Attending: PSYCHIATRY & NEUROLOGY | Admitting: PSYCHIATRY & NEUROLOGY
Payer: MEDICARE

## 2020-08-27 VITALS
DIASTOLIC BLOOD PRESSURE: 70 MMHG | RESPIRATION RATE: 18 BRPM | HEART RATE: 91 BPM | SYSTOLIC BLOOD PRESSURE: 101 MMHG | OXYGEN SATURATION: 100 % | TEMPERATURE: 99 F

## 2020-08-27 DIAGNOSIS — F10.10 ALCOHOL ABUSE, UNCOMPLICATED: ICD-10-CM

## 2020-08-27 DIAGNOSIS — Z98.890 OTHER SPECIFIED POSTPROCEDURAL STATES: Chronic | ICD-10-CM

## 2020-08-27 DIAGNOSIS — F31.9 BIPOLAR DISORDER, UNSPECIFIED: ICD-10-CM

## 2020-08-27 DIAGNOSIS — Z90.49 ACQUIRED ABSENCE OF OTHER SPECIFIED PARTS OF DIGESTIVE TRACT: Chronic | ICD-10-CM

## 2020-08-27 LAB
ALBUMIN SERPL ELPH-MCNC: 4.1 G/DL — SIGNIFICANT CHANGE UP (ref 3.3–5)
ALP SERPL-CCNC: 73 U/L — SIGNIFICANT CHANGE UP (ref 40–120)
ALT FLD-CCNC: 300 U/L — HIGH (ref 4–41)
AMPHET UR-MCNC: NEGATIVE — SIGNIFICANT CHANGE UP
ANION GAP SERPL CALC-SCNC: 13 MMO/L — SIGNIFICANT CHANGE UP (ref 7–14)
APAP SERPL-MCNC: < 15 UG/ML — LOW (ref 15–25)
APPEARANCE UR: CLEAR — SIGNIFICANT CHANGE UP
AST SERPL-CCNC: 140 U/L — HIGH (ref 4–40)
BARBITURATES UR SCN-MCNC: NEGATIVE — SIGNIFICANT CHANGE UP
BASOPHILS # BLD AUTO: 0.07 K/UL — SIGNIFICANT CHANGE UP (ref 0–0.2)
BASOPHILS NFR BLD AUTO: 0.8 % — SIGNIFICANT CHANGE UP (ref 0–2)
BENZODIAZ UR-MCNC: NEGATIVE — SIGNIFICANT CHANGE UP
BILIRUB SERPL-MCNC: 0.3 MG/DL — SIGNIFICANT CHANGE UP (ref 0.2–1.2)
BILIRUB UR-MCNC: NEGATIVE — SIGNIFICANT CHANGE UP
BLOOD UR QL VISUAL: NEGATIVE — SIGNIFICANT CHANGE UP
BUN SERPL-MCNC: 14 MG/DL — SIGNIFICANT CHANGE UP (ref 7–23)
CALCIUM SERPL-MCNC: 9 MG/DL — SIGNIFICANT CHANGE UP (ref 8.4–10.5)
CANNABINOIDS UR-MCNC: NEGATIVE — SIGNIFICANT CHANGE UP
CHLORIDE SERPL-SCNC: 105 MMOL/L — SIGNIFICANT CHANGE UP (ref 98–107)
CO2 SERPL-SCNC: 22 MMOL/L — SIGNIFICANT CHANGE UP (ref 22–31)
COCAINE METAB.OTHER UR-MCNC: NEGATIVE — SIGNIFICANT CHANGE UP
COLOR SPEC: SIGNIFICANT CHANGE UP
CREAT SERPL-MCNC: 0.8 MG/DL — SIGNIFICANT CHANGE UP (ref 0.5–1.3)
EOSINOPHIL # BLD AUTO: 0.06 K/UL — SIGNIFICANT CHANGE UP (ref 0–0.5)
EOSINOPHIL NFR BLD AUTO: 0.7 % — SIGNIFICANT CHANGE UP (ref 0–6)
ETHANOL BLD-MCNC: 111 MG/DL — HIGH
GLUCOSE SERPL-MCNC: 129 MG/DL — HIGH (ref 70–99)
GLUCOSE UR-MCNC: NEGATIVE — SIGNIFICANT CHANGE UP
HCT VFR BLD CALC: 47.1 % — SIGNIFICANT CHANGE UP (ref 39–50)
HGB BLD-MCNC: 16.4 G/DL — SIGNIFICANT CHANGE UP (ref 13–17)
IMM GRANULOCYTES NFR BLD AUTO: 2.6 % — HIGH (ref 0–1.5)
KETONES UR-MCNC: NEGATIVE — SIGNIFICANT CHANGE UP
LEUKOCYTE ESTERASE UR-ACNC: NEGATIVE — SIGNIFICANT CHANGE UP
LYMPHOCYTES # BLD AUTO: 2.77 K/UL — SIGNIFICANT CHANGE UP (ref 1–3.3)
LYMPHOCYTES # BLD AUTO: 33.3 % — SIGNIFICANT CHANGE UP (ref 13–44)
MCHC RBC-ENTMCNC: 30.9 PG — SIGNIFICANT CHANGE UP (ref 27–34)
MCHC RBC-ENTMCNC: 34.8 % — SIGNIFICANT CHANGE UP (ref 32–36)
MCV RBC AUTO: 88.9 FL — SIGNIFICANT CHANGE UP (ref 80–100)
METHADONE UR-MCNC: NEGATIVE — SIGNIFICANT CHANGE UP
MONOCYTES # BLD AUTO: 0.83 K/UL — SIGNIFICANT CHANGE UP (ref 0–0.9)
MONOCYTES NFR BLD AUTO: 10 % — SIGNIFICANT CHANGE UP (ref 2–14)
NEUTROPHILS # BLD AUTO: 4.36 K/UL — SIGNIFICANT CHANGE UP (ref 1.8–7.4)
NEUTROPHILS NFR BLD AUTO: 52.6 % — SIGNIFICANT CHANGE UP (ref 43–77)
NITRITE UR-MCNC: NEGATIVE — SIGNIFICANT CHANGE UP
NRBC # FLD: 0 K/UL — SIGNIFICANT CHANGE UP (ref 0–0)
OPIATES UR-MCNC: POSITIVE — SIGNIFICANT CHANGE UP
OXYCODONE UR-MCNC: NEGATIVE — SIGNIFICANT CHANGE UP
PCP UR-MCNC: NEGATIVE — SIGNIFICANT CHANGE UP
PH UR: 6 — SIGNIFICANT CHANGE UP (ref 5–8)
PLATELET # BLD AUTO: 269 K/UL — SIGNIFICANT CHANGE UP (ref 150–400)
PMV BLD: 8.8 FL — SIGNIFICANT CHANGE UP (ref 7–13)
POTASSIUM SERPL-MCNC: 4.5 MMOL/L — SIGNIFICANT CHANGE UP (ref 3.5–5.3)
POTASSIUM SERPL-SCNC: 4.5 MMOL/L — SIGNIFICANT CHANGE UP (ref 3.5–5.3)
PROT SERPL-MCNC: 6.8 G/DL — SIGNIFICANT CHANGE UP (ref 6–8.3)
PROT UR-MCNC: NEGATIVE — SIGNIFICANT CHANGE UP
RBC # BLD: 5.3 M/UL — SIGNIFICANT CHANGE UP (ref 4.2–5.8)
RBC # FLD: 13.8 % — SIGNIFICANT CHANGE UP (ref 10.3–14.5)
SALICYLATES SERPL-MCNC: < 5 MG/DL — LOW (ref 15–30)
SARS-COV-2 RNA SPEC QL NAA+PROBE: SIGNIFICANT CHANGE UP
SODIUM SERPL-SCNC: 140 MMOL/L — SIGNIFICANT CHANGE UP (ref 135–145)
SP GR SPEC: 1.01 — SIGNIFICANT CHANGE UP (ref 1–1.04)
TSH SERPL-MCNC: 0.82 UIU/ML — SIGNIFICANT CHANGE UP (ref 0.27–4.2)
UROBILINOGEN FLD QL: NORMAL — SIGNIFICANT CHANGE UP
WBC # BLD: 8.31 K/UL — SIGNIFICANT CHANGE UP (ref 3.8–10.5)
WBC # FLD AUTO: 8.31 K/UL — SIGNIFICANT CHANGE UP (ref 3.8–10.5)

## 2020-08-27 PROCEDURE — 71046 X-RAY EXAM CHEST 2 VIEWS: CPT | Mod: 26

## 2020-08-27 RX ORDER — METOPROLOL TARTRATE 50 MG
50 TABLET ORAL EVERY 12 HOURS
Refills: 0 | Status: DISCONTINUED | OUTPATIENT
Start: 2020-08-28 | End: 2020-09-03

## 2020-08-27 RX ORDER — OLANZAPINE 15 MG/1
2.5 TABLET, FILM COATED ORAL EVERY 6 HOURS
Refills: 0 | Status: DISCONTINUED | OUTPATIENT
Start: 2020-08-28 | End: 2020-08-30

## 2020-08-27 RX ORDER — LAMOTRIGINE 25 MG/1
25 TABLET, ORALLY DISINTEGRATING ORAL DAILY
Refills: 0 | Status: DISCONTINUED | OUTPATIENT
Start: 2020-08-28 | End: 2020-09-01

## 2020-08-27 RX ORDER — ASPIRIN/CALCIUM CARB/MAGNESIUM 324 MG
81 TABLET ORAL DAILY
Refills: 0 | Status: DISCONTINUED | OUTPATIENT
Start: 2020-08-28 | End: 2020-09-03

## 2020-08-27 RX ORDER — CALCIUM CARBONATE 500(1250)
3 TABLET ORAL DAILY
Refills: 0 | Status: DISCONTINUED | OUTPATIENT
Start: 2020-08-28 | End: 2020-09-03

## 2020-08-27 RX ORDER — LOSARTAN POTASSIUM 100 MG/1
50 TABLET, FILM COATED ORAL DAILY
Refills: 0 | Status: DISCONTINUED | OUTPATIENT
Start: 2020-08-28 | End: 2020-09-03

## 2020-08-27 RX ORDER — ACETAMINOPHEN 500 MG
650 TABLET ORAL EVERY 4 HOURS
Refills: 0 | Status: DISCONTINUED | OUTPATIENT
Start: 2020-08-28 | End: 2020-09-03

## 2020-08-27 RX ORDER — PANTOPRAZOLE SODIUM 20 MG/1
40 TABLET, DELAYED RELEASE ORAL
Refills: 0 | Status: DISCONTINUED | OUTPATIENT
Start: 2020-08-28 | End: 2020-09-03

## 2020-08-27 RX ORDER — DULOXETINE HYDROCHLORIDE 30 MG/1
80 CAPSULE, DELAYED RELEASE ORAL DAILY
Refills: 0 | Status: DISCONTINUED | OUTPATIENT
Start: 2020-08-28 | End: 2020-09-03

## 2020-08-27 RX ORDER — LAMOTRIGINE 25 MG/1
200 TABLET, ORALLY DISINTEGRATING ORAL DAILY
Refills: 0 | Status: DISCONTINUED | OUTPATIENT
Start: 2020-08-28 | End: 2020-09-03

## 2020-08-27 RX ORDER — MAGNESIUM OXIDE 400 MG ORAL TABLET 241.3 MG
400 TABLET ORAL DAILY
Refills: 0 | Status: DISCONTINUED | OUTPATIENT
Start: 2020-08-28 | End: 2020-09-03

## 2020-08-27 RX ORDER — MUPIROCIN 20 MG/G
1 OINTMENT TOPICAL
Refills: 0 | Status: COMPLETED | OUTPATIENT
Start: 2020-08-28 | End: 2020-08-28

## 2020-08-27 NOTE — ED PROVIDER NOTE - PSYCHIATRIC RISK
verbalization of suicidal thoughts otherwise normal/VERBALIZING SUICIDAL IDEATIONS VERBALIZING SUICIDAL IDEATIONS

## 2020-08-27 NOTE — ED ADULT NURSE NOTE - OBJECTIVE STATEMENT
Pt is a 64yo male BIB EMS for suicidal thoughts. Pt called EMS on himself for suicidal thoughts. Pt reports SI with plan to take a hot bath or slit his wrist. States this SI episode was triggered by recent events that involved his family/friends declining health. Reports hx bipolar disorder, depression. Recently had an AICD implanted last Thursday. Denies HI, and auditory and visual hallucinations. Denies any recent alcohol and drug use. Ongoing psychiatric evaluation in progress. Pt is a 64yo male BIB EMS for suicidal thoughts. Pt called EMS on himself for suicidal thoughts. Pt reports SI with plan to take a hot bath or slit his wrist. States this SI episode was triggered by recent events that involved his family/friends declining health. Reports hx bipolar disorder, depression. Recently had an AICD implanted last Thursday. Has a past hx of suicide attempts 3 years ago via valium OD. Denies HI, and auditory and visual hallucinations. Denies any recent alcohol and drug use. Ongoing psychiatric evaluation in progress.

## 2020-08-27 NOTE — ED BEHAVIORAL HEALTH ASSESSMENT NOTE - ADDITIONAL DETAILS / COMMENTS
Patient demonstrates poor insight into the history and extent of his illness in regards to his substance abuse (+utox) and mood episodes. He states active suicidality with intent and researched plan and threatens suicide in reference to his marital issues with his .
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
## 2020-08-27 NOTE — ED BEHAVIORAL HEALTH ASSESSMENT NOTE - CURRENT MEDICATION
200 mg Lamictal, 20 mg Lamictal, 30 mg Buspar, 30 mg Vraylar, 0.5 mg Klonopin TID, 60 mg Cymbalta BID 225 mg Lamictal, 30 mg Buspar BID, 30 mg Vraylar, 0.5 mg Klonopin TID, 60 mg Cymbalta BID

## 2020-08-27 NOTE — ED BEHAVIORAL HEALTH ASSESSMENT NOTE - MODIFICATIONS
I have seen and examined the patient with Dr JULIETTE Cole  and performed ott elements of the History and Mental Status Examination.  I concur with her assessment and recommendations.   I have discussed the Pt's assessment and plan of care with Dr JULIETTE Cole.   I agree with the note as stated above, having amended the EMR as needed to reflect my findings.  This includes during the time I functioned as the attending physician for this Pt at the -ED of [facility].

## 2020-08-27 NOTE — ED BEHAVIORAL HEALTH ASSESSMENT NOTE - SUICIDE PROTECTIVE FACTORS
Identifies reasons for living/Responsibility to family and others/Supportive social network of family or friends Advent beliefs/Responsibility to family and others/Identifies reasons for living/Supportive social network of family or friends

## 2020-08-27 NOTE — ED BEHAVIORAL HEALTH ASSESSMENT NOTE - HPI (INCLUDE ILLNESS QUALITY, SEVERITY, DURATION, TIMING, CONTEXT, MODIFYING FACTORS, ASSOCIATED SIGNS AND SYMPTOMS)
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 80 years and two osteopathic medical student tenants, unemployed, non-caregiver, prior psychiatric diagnoses of Bipolar I d/o, PTSD, polysubstance abuse, 3 prior inpatient psychiatric hospitalizations (most recently at [facility] in 2019 for suicide attempt), hx of suicidality with 1 prior documented suicide attempt in 2019 via BDZ OD, no hx of NSSIB, no hx of violence/aggression, no legal issues, prior hx of polysubstance abuse including alcohol, marijuana, and amphetamines, no documented withdrawal episodes, PMH significant for MI 2 weeks ago with ICD implantation, obesity, and HTN BIB EMS activated by home health aide for suicidal ideation and stated plan in the setting of feeling overwhelmed with health and psychosocial issues for the past 2 weeks.    Patient states that after his last ECT appointment 3 weeks ago, he visited [place] to help a friend who was recently given an LVAD and since then has been feeling increasingly overwhelmed with his home life and health issues. He suffered an MI 2 weeks ago and was hospitalized at [place] which culminated with implantation of an AICD. His  is 80 years old and he feels that he shoulders the burden of home care despite being on bed rest. He states he feels incredibly stressed as he has already lost friends to [facility]- and now feels overwhelmed by all of the health issues surrounding him, compounded by his  being unable and unwilling to help him with his home duties. Patient states that he told the home health aide/care worker from [facility] that if his  did not come around to being more sensitive to his issues, he "intends to commit suicide" by slitting his wrists and taking a hot bath.     Patient carries prior psychiatric diagnoses of Bipolar I d/o, most recently with depressive episode, PTSD, and polysubstance abuse including marijuana, EtOH, and amphetamines. He has had 3 prior inpatient hospitalizations, most recently in 2019 for a suicide attempt via overdose on 90 pills of Klonopin. Patient was formerly enrolled in ECT at [facility] and followed up at the [facility], but discontinued treatment on 8/24/2020 as he states he does not want to pursue care with [facility] anymore. He states he "does not need ECT", just Klonopin for his anxiety, and that he intends to find a provider who will prescribe him such.  He denies any current substance use and states that he attends AA as his father and brother were alcoholics. He denies any manic symptoms currently or in the past, but chart review reveals prior episodes of both substance use and seth. No current/prior psychotic symptoms, no HI/I/P. 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 80 years and two osteopathic medical student tenants, unemployed, non-caregiver, prior psychiatric diagnoses of Bipolar I d/o, PTSD, polysubstance abuse, 3 prior inpatient psychiatric hospitalizations (most recently at [facility] in 2019 for suicide attempt), hx of suicidality with 1 prior documented suicide attempt in 2019 via BDZ OD, no hx of NSSIB, no hx of violence/aggression, no legal issues, prior hx of polysubstance abuse including alcohol, marijuana, and amphetamines, no documented withdrawal episodes, PMH significant for MI 2 weeks ago with ICD implantation, hearing loss, obesity, and HTN BIB EMS activated by home health aide for suicidal ideation and stated plan in the setting of feeling overwhelmed with health and psychosocial issues for the past 2 weeks.    Patient states that after his last ECT appointment 3 weeks ago, he visited [place] to help a friend who was recently given an LVAD and since then has been feeling increasingly overwhelmed with his home life and health issues.  He suffered an MI 2 weeks ago and was hospitalized at [place] which culminated with implantation of an AICD. His  is 80 years old and he feels that he shoulders the burden of home care despite being on bed rest. He states he feels incredibly stressed as he has already lost friends to COVID-19 and now feels overwhelmed by all of the health issues surrounding him, similar to his experience during the AIDS pandemic. 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 Patient states that his anxiety has been compounded by his  being unable and unwilling to help him with his home duties. He endorses sleeping poorly, waking multiple times in the night and losing 11 lbs recently. Patient states that he told the home health aide/care worker from [facility] that if his  did not come around to being more sensitive to his issues, he "intends to commit suicide" by slitting his wrists and taking a hot bath.     Patient carries prior psychiatric diagnoses of Bipolar I d/o, most recently with depressive episode, PTSD, and polysubstance abuse including marijuana, EtOH, and amphetamines. He has had 3 prior inpatient hospitalizations, most recently in 2019 for a suicide attempt via overdose on 90 pills of Klonopin. Patient was formerly enrolled in ECT at [facility] and followed up at the [facility], but discontinued treatment on 8/24/2020 as he states he does not want to pursue care with [facility] anymore. He states he "does not need ECT", just Klonopin for his anxiety, and that he intends to find a provider who will prescribe him such.  He denies any current substance use and states that he attends AA as his father and brother were alcoholics. He denies any manic symptoms currently or in the past, but chart review reveals prior episodes of both substance use and seth. No current/prior psychotic symptoms, no HI/I/P. 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 80 years and two osteopathic medical student tenants, unemployed, non-caregiver, prior psychiatric diagnoses of Bipolar I d/o, PTSD, polysubstance abuse with +utox for EtOH, 3 prior inpatient psychiatric hospitalizations (most recently at [facility] in 2019 for suicide attempt), hx of suicidality with 1 prior documented suicide attempt in 2019 via BDZ OD, no hx of NSSIB, no hx of violence/aggression, no legal issues, prior hx of polysubstance abuse including alcohol, marijuana, and amphetamines, no documented withdrawal episodes, PMH significant for MI 2 weeks ago with ICD implantation, hearing loss, obesity, and HTN BIB EMS activated by home health aide for suicidal ideation and stated plan in the setting of feeling overwhelmed with health and psychosocial issues for the past 2 weeks.    Patient states that after his last ECT appointment 3 weeks ago, he visited [place] to help a friend who was recently given an LVAD and since then has been feeling increasingly overwhelmed with his home life and health issues.  He suffered an MI 2 weeks ago and was hospitalized at [place] which culminated with implantation of an AICD. His  is 80 years old and he feels that he shoulders the burden of home care despite being on bed rest. He states he feels incredibly stressed as he has already lost friends to COVID-19 and now feels overwhelmed by all of the health issues surrounding him, similar to his experience during the AIDS pandemic. 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 Currently, he states that his anxiety has been compounded by his  being unable and unwilling to help him with his home duties. He endorses sleeping poorly, waking multiple times in the night and losing 11 lbs recently. Patient states that he told the home health aide/care worker from [facility] that if his  did not come around to being more sensitive to his issues, he "intends to commit suicide" by slitting his wrists and taking a hot bath.     Patient carries prior psychiatric diagnoses of Bipolar I d/o, most recently with depressive episode, PTSD, and polysubstance abuse including marijuana, EtOH, and amphetamines. He has had 3 prior inpatient hospitalizations, most recently in 2019 for a suicide attempt via overdose on 90 pills of Klonopin. Patient was formerly enrolled in ECT at [facility] and followed up at the [facility], but discontinued treatment on 8/24/2020 as he states he does not want to pursue care with [facility] anymore. He states he "does not need ECT", just Klonopin for his anxiety, and that he intends to find a provider who will prescribe him such.  He denies any current substance use and states that drinks 1 drink/month and attends AA only because his father and brother were alcoholics. He denies any manic symptoms currently or in the past, but chart review reveals prior episodes of both substance use and seth. No current/prior psychotic symptoms, no HI/I/P. 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 80 years and two osteopathic medical student tenants, unemployed, non-caregiver, prior psychiatric diagnoses of Bipolar I d/o, PTSD, polysubstance abuse with +utox for EtOH, 3 prior inpatient psychiatric hospitalizations (most recently at [facility] in 2019 for suicide attempt), hx of suicidality with 1 prior documented suicide attempt in 2019 via BDZ OD, no hx of NSSIB, no hx of violence/aggression, no legal issues, prior hx of polysubstance abuse including alcohol, marijuana, and amphetamines, no documented withdrawal episodes, PMH significant for MI 2 weeks ago with ICD implantation, hearing loss, obesity, and HTN BIB EMS activated by home health aide for suicidal ideation and stated plan in the setting of feeling overwhelmed with health and psychosocial issues for the past 2 weeks.    Patient states that after his last ECT appointment 3 weeks ago, he visited [place] to help a friend who was recently given an LVAD and since then has been feeling increasingly overwhelmed with his home life and health issues.  He suffered an MI 2 weeks ago and was hospitalized at [place] which culminated with implantation of an AICD. His  is 80 years old and he feels that he shoulders the burden of home care despite being on bed rest. He states he feels incredibly stressed as he has already lost friends to COVID-19 and now feels overwhelmed by all of the health issues surrounding him, similar to his experience during the AIDS pandemic. 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 Currently, he states that his anxiety has been compounded by his  being unable and unwilling to help him with his home duties. He endorses sleeping poorly, waking multiple times in the night and losing 11 lbs recently. Patient states that he told the home health aide/care worker from [facility] that if his  did not come around to being more sensitive to his issues, he "intends to commit suicide" by slitting his wrists and taking a hot bath.     Patient carries prior psychiatric diagnoses of Bipolar I d/o, most recently with depressive episode, PTSD, and polysubstance abuse including marijuana, EtOH, and amphetamines. He has had 3 prior inpatient hospitalizations, most recently in 2019 for a suicide attempt via overdose on 90 pills of Klonopin. Patient was formerly enrolled in ECT at [facility] and followed up at the [facility], but discontinued treatment on 8/24/2020 as he states he does not want to pursue care with [facility] anymore. He states he "does not need ECT", just Klonopin for his anxiety, and that he intends to find a provider who will prescribe him such.  He denies any current substance use and states that drinks 1 drink/month and attends AA only because his father and brother were alcoholics. He denies any manic symptoms currently or in the past, but chart review reveals prior episodes of both substance use and seth. No current/prior psychotic symptoms, no HI/I/P.    FOR COLLATERAL INFORMATION OBTAINED, PLS REFER TO [facility] NOTES FOR FURTHER DETAILS 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 80 years and two osteopathic medical student tenants, unemployed, non-caregiver, prior psychiatric diagnoses of Bipolar I d/o, PTSD, polysubstance abuse with +utox for opiate with elevated BAL, 3 prior inpatient psychiatric hospitalizations (most recently at [facility] in 2019 for suicide attempt), hx of suicidality with 1 prior documented suicide attempt in 2019 via BDZ OD, no hx of NSSIB, no hx of violence/aggression, no legal issues, prior hx of polysubstance abuse including alcohol, marijuana, and amphetamines, no documented withdrawal episodes, PMH significant for MI 2 weeks ago with ICD implantation, hearing loss, obesity, and HTN BIB EMS activated by home health aide for suicidal ideation and stated plan in the setting of feeling overwhelmed with health and psychosocial issues for the past 2 weeks.    Patient states that after his last ECT appointment 3 weeks ago, he visited [place] to help a friend who was recently given an LVAD and since then has been feeling increasingly overwhelmed with his home life and health issues.  He suffered an MI 2 weeks ago and was hospitalized at [place] which culminated with implantation of an AICD. His  is 80 years old and he feels that he shoulders the burden of home care despite being on bed rest. He states he feels incredibly stressed as he has already lost friends to COVID-19 and now feels overwhelmed by all of the health issues surrounding him, similar to his experience during the AIDS pandemic. 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 Currently, he states that his anxiety has been compounded by his  being unable and unwilling to help him with his home duties. He endorses sleeping poorly, waking multiple times in the night and losing 11 lbs recently. Patient states that he told the home health aide/care worker from [facility] that if his  did not come around to being more sensitive to his issues, he "intends to commit suicide" by slitting his wrists and taking a hot bath.     Patient carries prior psychiatric diagnoses of Bipolar I d/o, most recently with depressive episode, PTSD, and polysubstance abuse including marijuana, EtOH, and amphetamines. He has had 3 prior inpatient hospitalizations, most recently in 2019 for a suicide attempt via overdose on 90 pills of Klonopin. Patient was formerly enrolled in ECT at [facility] and followed up at the [facility], but discontinued treatment on 8/24/2020 as he states he does not want to pursue care with [facility] anymore. He states he "does not need ECT", just Klonopin for his anxiety, and that he intends to find a provider who will prescribe him such.  He denies any current substance use and states that drinks 1 drink/month and attends AA only because his father and brother were alcoholics. He denies any manic symptoms currently or in the past, but chart review reveals prior episodes of both substance use and seth. No current/prior psychotic symptoms, no HI/I/P.    FOR COLLATERAL INFORMATION OBTAINED, PLS REFER TO [facility] NOTES FOR FURTHER DETAILS

## 2020-08-27 NOTE — ED PROVIDER NOTE - EMPLOYMENT
Was seen on 4/6/2017 for MWV.  He said that when he got the bill it was double charged?    
N/A

## 2020-08-27 NOTE — ED BEHAVIORAL HEALTH ASSESSMENT NOTE - SUICIDE RISK FACTORS
Recent onset of current/past psychiatric diagnosis/History of abuse/trauma/Insomnia/Access to lethal methods (pills, firearm, etc.: Ask specifically about presence or absence of a firearm in the home or ease of accessing/Current mood episode/PTSD current/past/Mood Disorder current/past/Agitation/Severe Anxiety/Panic/Anhedonia/Impulsivity/Alcohol/Substance abuse disorders History of abuse/trauma/Anhedonia/Recent onset of current/past psychiatric diagnosis/Unable to engage in safety planning/Alcohol/Substance abuse disorders/Insomnia/Current mood episode/PTSD current/past/Mood Disorder current/past/Agitation/Severe Anxiety/Panic/Access to lethal methods (pills, firearm, etc.: Ask specifically about presence or absence of a firearm in the home or ease of accessing/Impulsivity

## 2020-08-27 NOTE — ED ADULT TRIAGE NOTE - CHIEF COMPLAINT QUOTE
Pt arrives with EMS with thoughts of SI. Pt states he has a plan to "slit his wrists and take a hot bath." Reports attempting suicide 3 years ago ("took a bunch of pills") and then was admitted to [facility]. Pt also had an AICD placed last Thursday. Denies HI, hallucinations, ETOH use, drug use.

## 2020-08-27 NOTE — ED PROVIDER NOTE - OBJECTIVE STATEMENT
66 y/o M hx of bipolar and AICD placement 1 week ago presents with suicidal  thoughts. Plan for slitting wrists. Pt states after AICD has been unable to do any lifting or put arms above head which made him suicidal. Tried to commit suicide 3 years by overdosing on Valium. Compliant with medication. Denies HI, AH, VH. No drug or alcohol use. Endorses mild tenderness to implantation sight. Denies CP, dizziness, nausea, vomiting, fainting, palpations, fever, chills or any other complaints or concerns. 64 y/o M hx of bipolar and AICD placement 1 week ago presents with suicidal  thoughts. Plan for slitting wrists. Pt states after AICD has been unable to do any lifting or put arms above head which made him suicidal. Tried to commit suicide 3 years by overdosing on Valium. States he is compliant with medication. Denies HI, AH, VH. Denies recent recreational drug or alcohol use. Endorses mild tenderness to implantation sight. Denies CP, dizziness, nausea, vomiting, fainting, palpations, fever, chills or any other complaints or concerns.

## 2020-08-27 NOTE — ED BEHAVIORAL HEALTH ASSESSMENT NOTE - OTHER PAST PSYCHIATRIC HISTORY (INCLUDE DETAILS REGARDING ONSET, COURSE OF ILLNESS, INPATIENT/OUTPATIENT TREATMENT)
He has been hospitalized psychiatrically 3 times in the past, most recently in 2019 at [facility] for a suicide attempt. He has been diagnosed with bipolar I disorder with recent depressive episode, anxiety disorder, PTSD, polysubstance use d/o. He had been receiving ECT at [facility] with follow-up at [facility] since d/c from his last hospitalization in 12/2019 for a suicide attempt using BDZ (90 pills of Klonopin), but discontinued this care as he is dissatisfied. He states he does not need ECT, just Klonopin. Per chart review, he has had some prior seth. No current AVT hallucinations, no prior psychotic symptoms, no HI/I/P. He has been hospitalized psychiatrically 3 times in the past, most recently in 2019 at [facility] for a suicide attempt. He has been diagnosed with bipolar I disorder with recent depressive episode, anxiety disorder, PTSD, polysubstance use d/o. He had been receiving ECT at [facility] with follow-up at [facility] since d/c from his last hospitalization in 12/2019 for a suicide attempt using BDZ (90 pills of Klonopin), but discontinued this care as he is dissatisfied. He states he does not need ECT, just Klonopin. Per chart review, he has had some prior manic episodes. Per outpatient chart he has attended several detox/rehabs in the past and has many other prior psychiatric hospitalizations. No current AVT hallucinations, no prior psychotic symptoms, no HI/I/P.

## 2020-08-27 NOTE — ED BEHAVIORAL HEALTH ASSESSMENT NOTE - ADDITIONAL DETAILS ALL
Hx of chronic passive and active SI, prior SA in 2019 using 90 pills of Klonopin, states he has prepared to take his own life through researching methods and creating plan.

## 2020-08-27 NOTE — ED BEHAVIORAL HEALTH ASSESSMENT NOTE - SUMMARY
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 80 years and two osteopathic medical student tenants, unemployed, non-caregiver, prior psychiatric diagnoses of Bipolar I d/o, PTSD, polysubstance abuse with +utox for EtOH, 3 prior inpatient psychiatric hospitalizations (most recently at [facility] in 2019 for suicide attempt), hx of suicidality with 1 prior documented suicide attempt in 2019 via BDZ OD, no hx of NSSIB, no hx of violence/aggression, no legal issues, prior hx of polysubstance abuse including alcohol, marijuana, and amphetamines, no documented withdrawal episodes, PMH significant for MI 2 weeks ago with ICD implantation, hearing loss, obesity, and HTN BIB EMS activated by home health aide for suicidal ideation and stated plan in the setting of feeling overwhelmed with health and psychosocial issues for the past 2 weeks.    Patient is superficially cooperative throughout the interview and expresses suicidality at multiple times in the context of overwhelming stress from his medical issues, isolation and loss of friends, due to COVID-19, and loss of support from his . Due to these multiple stressors, he is unable to confirm his safety at home. He is also not forthcoming about his previous psychiatric history and exaggerates to the point of grandiosity in regards to his former education and employment.    Given his prior history of suicide attempt via medication overdose, his current access to the same medication (Klonopin), history of PTSD and abuse/trauma, history of substance abuse and current +utox for EtOH, and current inability to safety plan he will be involuntarily psychiatrically admitted for safety and stabilization.

## 2020-08-27 NOTE — ED BEHAVIORAL HEALTH NOTE - BEHAVIORAL HEALTH NOTE
Worker met with patient in the -ED and who provided spouse's number Paul ([phone]) and left a message for call back. Worker also observed paper work from [facility] discharge on 8/20 in paper chart (medications on discharge papers).

## 2020-08-27 NOTE — ED BEHAVIORAL HEALTH ASSESSMENT NOTE - DESCRIPTION (FIRST USE, LAST USE, QUANTITY, FREQUENCY, DURATION)
States he does not drink often but outlined in chart. Frequent Prescription Chronic use Amphetamines

## 2020-08-27 NOTE — ED ADULT NURSE NOTE - CHIEF COMPLAINT QUOTE
Pt arrives with EMS with thoughts of SI. Pt states he has a plan to "slit his wrists and take a hot bath." Reports attempting suicide 3 years ago ("took a bunch of pills") and then was admitted to [facility]. Pt also had an AICD placed last Thursday. Denies HI, hallucinations, ETOH use, drug use.

## 2020-08-27 NOTE — ED BEHAVIORAL HEALTH ASSESSMENT NOTE - CASE SUMMARY
65/M with hx of Bipolar I disorder, PTSD and unspecified Personality disorder, prior in-Pt psych admissions (last admitted to [facility] in 10/2019 after he overdosed on Klonopin; and hx of polysubstance abuse.  Pertinent medical issues include: CAD s/p AICD implantation x 2 wks ago, hx of obesity, chronic back pain, knee pain s/p surgery and HTN.  Today, presented to the [facility] EMS as activated by A due to suicidal ideation with plan to slit his wrist.  Pt reported that he has been increasingly depressed lately, anxious and having suicidal thoughts with plan to slit his wrist.  Said depression and anxiety symptoms have been precipitated and perpetuated by ongoing multiple psychosocial stressors (but predomninant, chronic relational conflict with his  of 43 yrs).      At this time, he appears dysphoric, jittery and anxious.  Pt is affectively dysregulated, has low frustration tolerance, grandiose (but not within context of acute seth - likely part of his personality), has poor insight and impaired judgement. He remains unpredictable and is unable to partake towards safety planning.  He continues to verbalize suicidal thoughts with plan to slit wrist (if he does not get "support from his ").  Pt is not homicidal.  Does not appear to be intoxicated or in withdrawal.  He is no acutely manic nor psychotic.  Given past psych hx, prior SA, multiple complex medical issues with underlying physiological deterioration, limited social support and ongoing multiple psychosocial stressors all contributing towards Pt's current clinical presentation, the Pt is not deemed dischargeable back to the community.  He remains a threat to himself.  Henceforth, will benefit from in-Pt psych admission for stabilization of mood and ensuring safety.   RECOMMENDATION:       1. CONTINUE WITH THE FOLLOWING: Cymbalta 80mg daily, Lamictal 25mg daily + Lamictal 200mg daily and Buspar 30mg daily        2. PRN: Klonopin 0.5mg q6Hrs PRN for anxiety; Zyprexa 2.5mg IM q6hrs PRN for SEVERE AGITATION       3. Consider ECT consultation when he is at the [facility] psych unit       4. CONTINUE ALL OTHER MEDS PRESCRIBED: ASA 81mg daily, metoprolol succinate 50mg BID, Losartan 50mg daily, protonix 40mg daily, Calcium ascorbate 1500mg daily, Magnesium 500mg daily and Bactroban 2% apply to affected area x 1 day more       5. PRN: Tylenol 650mg q4Hrs PRN for pain       6. Once medically cleared, to facilitate transfer to [facility] on 9.39 status 65/M with hx of Bipolar I disorder, PTSD and unspecified Personality disorder, prior in-Pt psych admissions (last admitted to [facility] in 10/2019 after he overdosed on Klonopin; and hx of polysubstance abuse.  Pertinent medical issues include: CAD s/p AICD implantation x 2 wks ago, hx of obesity, chronic back pain, knee pain s/p surgery and HTN.  Today, presented to the ED BIB EMS as activated by A due to suicidal ideation with plan to slit his wrist.  Pt reported that he has been increasingly depressed lately, anxious and having suicidal thoughts with plan to slit his wrist.  Said depression and anxiety symptoms have been precipitated and perpetuated by ongoing multiple psychosocial stressors (but predominantly, chronic relational conflict with his  of 43 yrs).      At this time, he appears dysphoric, jittery and anxious.  Pt is affectively dysregulated, has low frustration tolerance, grandiose (but not within context of acute seth - likely part of his personality pathology), has poor insight and impaired judgement. He remains unpredictable and is unable to partake towards safety planning.  He continues to verbalize suicidal thoughts with plan to slit wrist (if he does not get "support from his ").  Pt is not homicidal.  Does not appear to be in withdrawal though biochemically, is intoxicated.  He is not acutely manic nor psychotic.  Given past psych hx, prior SA, multiple complex medical issues with underlying physiological deterioration, limited social support and ongoing multiple psychosocial stressors all contributing towards Pt's current clinical presentation, the Pt is not deemed dischargeable back to the community.  He remains a threat to himself.  Henceforth, will benefit from in-Pt psych admission for stabilization of mood and ensuring safety.   RECOMMENDATION:       1. CONTINUE WITH THE FOLLOWING: Cymbalta 80mg daily, Lamictal 25mg daily + Lamictal 200mg daily and Buspar 30mg daily        2. PRN: Klonopin 0.5mg q6Hrs PRN for anxiety; Zyprexa 2.5mg IM q6hrs PRN for SEVERE AGITATION       3. Consider ECT consultation when he is at the [facility] psych unit       4. CONTINUE ALL OTHER MEDS PRESCRIBED: ASA 81mg daily, metoprolol succinate 50mg BID, Losartan 50mg daily, Protonix 40mg daily, Calcium ascorbate 1500mg daily, Magnesium 500mg daily and Bactroban 2% apply to affected area x 1 day more       5. PRN: Tylenol 650mg q4Hrs PRN for pain       6. Once medically cleared, to facilitate transfer to [facility] on 9.39 status

## 2020-08-27 NOTE — ED BEHAVIORAL HEALTH NOTE - BEHAVIORAL HEALTH NOTE
Worker called patient’s spouse Paul Cain ([phone]) for collateral information. All information is as per Mr. Cain:    Patient is a 65 year old male, domiciled with spouse, with a diagnosis of bipolar disorder, with a recent medical hospitalization at [facility] for 3-4 day admission, BIBEMS for suicidal ideation. Mr. Cain states that the patient woke up and was in a bad mood. Mr. Cain states he has been with the patient for a number of years and he is 80 years old. Mr. Cain states that the patient is chronically in an angry mood. He states that the patient has had several treatments of ECT and 3 weeks ago had his last session. He states that the patient told him this morning that he was going to kill himself and also reported this to his A. Spouse reports that the patient was hospitalized psychiatrically at [facility] 5-6 months ago for three days. He states that the patient that the patient was medically admitted couple weeks ago for having a heart attack. He states that the patient has had past attempts of cutting himself superficially with a knife. Spouse states that the patient acts out for attention at times. He states that the patient is on a lot of psychotropic medications and has been compliant with medications. He denies that the patient is physically violent or aggressive and denies that the patient engages in any SIB. He reports that the patient had a recent weight gain of 50lbs but he does not know moderation with food. Case discussed with Dr. Weber. Patient will be admitted psychiatrically. Worker called central intake at [facility] and obtained bed on L5. Worker called patient’s spouse and left a message informing of patient admission and provided call numbers to unit. Worker called patient’s spouse Paul Cain ([phone]) for collateral information. All information is as per Mr. Cain:    Patient is a 65 year old male, domiciled with spouse, with a diagnosis of bipolar disorder, with a recent medical hospitalization at [facility] for 3-4 day admission, BIBEMS for suicidal ideation. Mr. Cain states that the patient woke up and was in a bad mood. Mr. Cain states he has been with the patient for a number of years and he is 80 years old. Mr. Cain states that the patient is chronically in an angry mood. He states that the patient has had several treatments of ECT and 3 weeks ago had his last session. He states that the patient told him this morning that he was going to kill himself and also reported this to his A. Spouse reports that the patient was hospitalized psychiatrically at [facility] 5-6 months ago for three days. He states that the patient that the patient was medically admitted couple weeks ago for having a heart attack. He states that the patient has had past attempts of cutting himself superficially with a knife. Spouse states that the patient acts out for attention at times. He states that the patient is on a lot of psychotropic medications and has been compliant with medications. He denies that the patient is physically violent or aggressive and denies that the patient engages in any SIB. He reports that the patient had a recent weight gain of 50lbs but he does not know moderation with food. Case discussed with Dr. Weber. Patient will be admitted psychiatrically. Worker called central intake at [facility] and obtained bed on L5. Worker called patient’s spouse and left a message informing of patient admission and provided call numbers to unit.    Worker received a call back from patient's spouse and informed of patient admission to L5.

## 2020-08-27 NOTE — ED BEHAVIORAL HEALTH ASSESSMENT NOTE - ACTIVATING EVENTS/STRESSORS
Change in provider or treatment (i.e., medications, psychotherapy, milieu)/Current or pending social isolation/Inadequate social supports/Acute medical problem

## 2020-08-27 NOTE — ED BEHAVIORAL HEALTH ASSESSMENT NOTE - PSYCHIATRIC ISSUES AND PLAN (INCLUDE STANDING AND PRN MEDICATION)
1. Cymbalta 80mg daily, Lamictal 25mg daily + Lamictal 200mg daily and Buspar 30mg daily        2. PRN: Klonopin 0.5mg q6Hrs PRN for anxiety; Zyprexa 2.5mg IM q6hrs PRN for SEVERE AGITATION

## 2020-08-27 NOTE — ED PROVIDER NOTE - SKIN COLOR
skin: TTP over ACID implantation left anterior chest wall, no redness streaking, no warmth, no discharge from wound/normal for race skin: TTP over ACID implantation left anterior chest wall, no redness streaking, no warmth, no discharge from wound, no dehiscence./normal for race

## 2020-08-27 NOTE — ED BEHAVIORAL HEALTH NOTE - BEHAVIORAL HEALTH NOTE
COVID Exposure Screen- Patient    1.	*In the past 14 days, have you been around anyone with a positive COVID-19 test?*   (  ) Yes   ( x ) No   (  ) Unknown- Reason (e.g. patient uncertain, sedated, refusing to answer, etc.):  ______  IF YES PROCEED TO QUESTION #2. IF NO or UNKNOWN, PLEASE SKIP TO QUESTION #7  2.	Were you within 6 feet of them for at least 15 minutes? (  ) Yes   (  ) No   (  ) Unknown- Reason: ______  N/A  3.	Have you provided care for them? (  ) Yes   (  ) No   (  ) Unknown- Reason: ______  N/A  4.	Have you had direct physical contact with them (touched, hugged, or kissed them)?  (  ) Yes   (  ) No    (  ) Unknown- Reason: ______  N/A  5.	Have you shared eating or drinking utensils with them? (  ) Yes   (  ) No    (  ) Unknown- Reason: ______  N/A  6.	Have they sneezed, coughed, or somehow got respiratory droplets on you? (  ) Yes   (  ) No    (  ) Unknown- Reason: ______  N/A    7.	*Have you been out of [place] within the past 14 days?*  (  ) Yes   ( x ) No   (  ) Unknown- Reason (e.g. patient uncertain, sedated, refusing to answer, etc.): _______  IF YES PLEASE ANSWER THE FOLLOWING QUESTIONS:  8.	Which state/country have you been to? ______  N/A  9.	Were you there over 24 hours? (  ) Yes   (  ) No    (  ) Unknown- Reason: ______  N/A  10.	What date did you return to [facility]? ______  N/A

## 2020-08-27 NOTE — ED BEHAVIORAL HEALTH ASSESSMENT NOTE - OTHER
Home Health Aide Patient had acute medical problem and numerous friends suffering acute medical problems/passed away from [facility]. He recently left treatment at [facility] and has not secured a new provider. Superficially cooperative Constricted, dysphoric Notable for suicidality, preoccupied with marital troubles. Some grandiosity, particularly in reference to occupation/education. Minimizes substance abuse. by hx: impaired, has reported low frustration tolerance see HPI for details CVM, I STOP Reference #: [id number]: 08/24/2020 08/24/2020 hydrocodone-acetaminophen 5-300 mg tablet  28 7 Leoncio Faulkner Medicare [facility] [id number] ;  08/20/2020 08/20/2020 clonazepam 0.5 mg tablet  12 4 Lorene Avila (DIANA) Medicare [facility] [id number]; 08/20/2020 08/20/2020 oxycodone hcl 5 mg tablet  12 3 Lorene Avila (DIANA) Medicare [facility] [id number]

## 2020-08-27 NOTE — ED BEHAVIORAL HEALTH ASSESSMENT NOTE - RISK ASSESSMENT
High Acute Suicide Risk Modifiable risk factors include irritability, depression, anxiety, SI  Unmodifiable risk factors: long hx of bipolar disorder and PTSD, personality pathology, hx of multiple psych admissions, hx of OD on benzo, family hx of alcoholism, elderly male with complex medical issues and chronic pains, concomitant substance abuse issues  Protective factors include lack of acute psychotic and manic symptoms, denying HI, no family hx of SA, no hx of violence, no access to guns, no legal issues, educated, feels a sense of responsibility towards , Adventist, domicility with some financial stability    At this time given all risks factors and current presentation considered, the Pt is at acute high suicidal risk as well as remaining at chronically elevated risk of suicidality.  Pt is deemed a threat to self and cannot be safely discharged back to the community.  Current risks can be mitigated by admission into a structured environment, application of psychopharmacologic and psychotherapy interventions aimed at mood stabilization and ensuring safety

## 2020-08-27 NOTE — ED PROVIDER NOTE - PROGRESS NOTE DETAILS
Labs reviewed, patient not in distress, nontoxic appearing and medically stable for inpatient psychiatric admission per the recommendation of ED psychiatry.

## 2020-08-27 NOTE — ED BEHAVIORAL HEALTH ASSESSMENT NOTE - DESCRIPTION
Up until this interview, patient has been polite and cooperative. No agitation, no aggression, no PRNs required. 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 Lives in private home in [place] where he rents to two osteopathic medical students. Up until this interview, patient has been polite and superficially cooperative; preoccupied with taking to his . The Pt is not homicidal.  Pt is not acutely manic or psychotic.  Does not appear to be clinically intoxicated (though, biochemically, BAL was at 111 - taken at 310PM) or in withdrawal.  He is not delirious.  NO current somatic complaints.  No agitation, no aggression, no PRNs required.    Vital Signs Last 24 Hrs  T(C): 37 (27 Aug 2020 13:16), Max: 37 (27 Aug 2020 13:16)  T(F): 98.6 (27 Aug 2020 13:16), Max: 98.6 (27 Aug 2020 13:16)  HR: 91 (27 Aug 2020 13:16) (91 - 91)  BP: 101/70 (27 Aug 2020 13:16) (101/70 - 101/70)  BP(mean): --  RR: 18 (27 Aug 2020 13:16) (18 - 18)  SpO2: 100% (27 Aug 2020 13:16) (100% - 100%)  LABS:                     16.4   8.31  )-----------( 269      ( 27 Aug 2020 15:10 )             47.1   27 Aug 2020 15:10  140    |  105    |  14     ----------------------------<  129    4.5     |  22     |  0.80     Ca    9.0        27 Aug 2020 15:10    TPro  6.8    /  Alb  4.1    /  TBili  0.3    /  DBili  x      /  AST  140    /  ALT  300    /  AlkPhos  73     27 Aug 2020 15:10    ; TOX: + Opiates (hx of prescribed oxycodone/ percocet) Obesity, HTN, prior MI, AICD implanted, hearing loss, chronic back and knee pains s/p surgery

## 2020-08-27 NOTE — ED BEHAVIORAL HEALTH ASSESSMENT NOTE - MEDICAL ISSUES AND PLAN (INCLUDE STANDING AND PRN MEDICATION)
CONTINUE ALL OTHER MEDS PRESCRIBED: ASA 81mg daily, metoprolol succinate 50mg BID, Losartan 50mg daily, protonix 40mg daily, Calcium ascorbate 1500mg daily, Magnesium 500mg daily and Bactroban 2% apply to affected area x 1 day more

## 2020-08-27 NOTE — ED ADULT NURSE NOTE - NSIMPLEMENTINTERV_GEN_ALL_ED
Implemented All Universal Safety Interventions:  [place] to call system. Call bell, personal items and telephone within reach. Instruct patient to call for assistance. Room bathroom lighting operational. Non-slip footwear when patient is off stretcher. Physically safe environment: no spills, clutter or unnecessary equipment. Stretcher in lowest position, wheels locked, appropriate side rails in place.

## 2020-08-28 LAB
CHOLEST SERPL-MCNC: 165 MG/DL — SIGNIFICANT CHANGE UP (ref 120–199)
HBA1C BLD-MCNC: 5.9 % — HIGH (ref 4–5.6)
HDLC SERPL-MCNC: 37 MG/DL — SIGNIFICANT CHANGE UP (ref 35–55)
LIPID PNL WITH DIRECT LDL SERPL: 95 MG/DL — SIGNIFICANT CHANGE UP
SARS-COV-2 IGG SERPL QL IA: NEGATIVE — SIGNIFICANT CHANGE UP
SARS-COV-2 IGM SERPL IA-ACNC: 0.11 INDEX — SIGNIFICANT CHANGE UP
TRIGL SERPL-MCNC: 297 MG/DL — HIGH (ref 10–149)

## 2020-08-28 PROCEDURE — 99222 1ST HOSP IP/OBS MODERATE 55: CPT | Mod: GC

## 2020-08-28 RX ORDER — CLONAZEPAM 1 MG
0.5 TABLET ORAL EVERY 6 HOURS
Refills: 0 | Status: DISCONTINUED | OUTPATIENT
Start: 2020-08-28 | End: 2020-08-28

## 2020-08-28 RX ORDER — CLONAZEPAM 1 MG
0.5 TABLET ORAL EVERY 6 HOURS
Refills: 0 | Status: DISCONTINUED | OUTPATIENT
Start: 2020-08-28 | End: 2020-08-30

## 2020-08-28 RX ADMIN — Medication 650 MILLIGRAM(S): at 02:14

## 2020-08-28 RX ADMIN — MAGNESIUM OXIDE 400 MG ORAL TABLET 400 MILLIGRAM(S): 241.3 TABLET ORAL at 09:38

## 2020-08-28 RX ADMIN — LOSARTAN POTASSIUM 50 MILLIGRAM(S): 100 TABLET, FILM COATED ORAL at 09:37

## 2020-08-28 RX ADMIN — DULOXETINE HYDROCHLORIDE 80 MILLIGRAM(S): 30 CAPSULE, DELAYED RELEASE ORAL at 09:37

## 2020-08-28 RX ADMIN — MUPIROCIN 1 APPLICATION(S): 20 OINTMENT TOPICAL at 21:22

## 2020-08-28 RX ADMIN — Medication 30 MILLIGRAM(S): at 01:57

## 2020-08-28 RX ADMIN — Medication 0.5 MILLIGRAM(S): at 01:40

## 2020-08-28 RX ADMIN — LAMOTRIGINE 25 MILLIGRAM(S): 25 TABLET, ORALLY DISINTEGRATING ORAL at 09:37

## 2020-08-28 RX ADMIN — Medication 3 TABLET(S): at 09:37

## 2020-08-28 RX ADMIN — Medication 650 MILLIGRAM(S): at 01:40

## 2020-08-28 RX ADMIN — Medication 50 MILLIGRAM(S): at 09:38

## 2020-08-28 RX ADMIN — Medication 30 MILLIGRAM(S): at 09:37

## 2020-08-28 RX ADMIN — LAMOTRIGINE 200 MILLIGRAM(S): 25 TABLET, ORALLY DISINTEGRATING ORAL at 09:37

## 2020-08-28 RX ADMIN — Medication 30 MILLIGRAM(S): at 21:22

## 2020-08-28 RX ADMIN — Medication 0.5 MILLIGRAM(S): at 08:18

## 2020-08-28 RX ADMIN — Medication 0.5 MILLIGRAM(S): at 21:20

## 2020-08-28 RX ADMIN — Medication 0.5 MILLIGRAM(S): at 15:04

## 2020-08-28 RX ADMIN — Medication 81 MILLIGRAM(S): at 09:37

## 2020-08-28 RX ADMIN — PANTOPRAZOLE SODIUM 40 MILLIGRAM(S): 20 TABLET, DELAYED RELEASE ORAL at 08:18

## 2020-08-28 RX ADMIN — Medication 50 MILLIGRAM(S): at 21:22

## 2020-08-29 ENCOUNTER — EMERGENCY (EMERGENCY)
Facility: HOSPITAL | Age: 65
LOS: 1 days | Discharge: HOME | End: 2020-08-29
Attending: EMERGENCY MEDICINE | Admitting: EMERGENCY MEDICINE
Payer: MEDICARE

## 2020-08-29 VITALS
TEMPERATURE: 99 F | RESPIRATION RATE: 18 BRPM | OXYGEN SATURATION: 100 % | SYSTOLIC BLOOD PRESSURE: 129 MMHG | DIASTOLIC BLOOD PRESSURE: 63 MMHG | HEIGHT: 70 IN | HEART RATE: 63 BPM

## 2020-08-29 DIAGNOSIS — Z98.890 OTHER SPECIFIED POSTPROCEDURAL STATES: Chronic | ICD-10-CM

## 2020-08-29 DIAGNOSIS — Z90.49 ACQUIRED ABSENCE OF OTHER SPECIFIED PARTS OF DIGESTIVE TRACT: Chronic | ICD-10-CM

## 2020-08-29 LAB
ALBUMIN SERPL ELPH-MCNC: 4.1 G/DL — SIGNIFICANT CHANGE UP (ref 3.3–5)
ALP SERPL-CCNC: 75 U/L — SIGNIFICANT CHANGE UP (ref 40–120)
ALT FLD-CCNC: 145 U/L — HIGH (ref 4–41)
ANION GAP SERPL CALC-SCNC: 14 MMO/L — SIGNIFICANT CHANGE UP (ref 7–14)
AST SERPL-CCNC: 42 U/L — HIGH (ref 4–40)
BASOPHILS # BLD AUTO: 0.05 K/UL — SIGNIFICANT CHANGE UP (ref 0–0.2)
BASOPHILS NFR BLD AUTO: 0.4 % — SIGNIFICANT CHANGE UP (ref 0–2)
BILIRUB SERPL-MCNC: 0.4 MG/DL — SIGNIFICANT CHANGE UP (ref 0.2–1.2)
BUN SERPL-MCNC: 21 MG/DL — SIGNIFICANT CHANGE UP (ref 7–23)
CALCIUM SERPL-MCNC: 9.4 MG/DL — SIGNIFICANT CHANGE UP (ref 8.4–10.5)
CHLORIDE SERPL-SCNC: 101 MMOL/L — SIGNIFICANT CHANGE UP (ref 98–107)
CO2 SERPL-SCNC: 24 MMOL/L — SIGNIFICANT CHANGE UP (ref 22–31)
CREAT SERPL-MCNC: 1.15 MG/DL — SIGNIFICANT CHANGE UP (ref 0.5–1.3)
EOSINOPHIL # BLD AUTO: 0.16 K/UL — SIGNIFICANT CHANGE UP (ref 0–0.5)
EOSINOPHIL NFR BLD AUTO: 1.3 % — SIGNIFICANT CHANGE UP (ref 0–6)
GLUCOSE SERPL-MCNC: 103 MG/DL — HIGH (ref 70–99)
HCT VFR BLD CALC: 46.3 % — SIGNIFICANT CHANGE UP (ref 39–50)
HGB BLD-MCNC: 15.6 G/DL — SIGNIFICANT CHANGE UP (ref 13–17)
IMM GRANULOCYTES NFR BLD AUTO: 0.7 % — SIGNIFICANT CHANGE UP (ref 0–1.5)
LYMPHOCYTES # BLD AUTO: 26.9 % — SIGNIFICANT CHANGE UP (ref 13–44)
LYMPHOCYTES # BLD AUTO: 3.22 K/UL — SIGNIFICANT CHANGE UP (ref 1–3.3)
MCHC RBC-ENTMCNC: 30.2 PG — SIGNIFICANT CHANGE UP (ref 27–34)
MCHC RBC-ENTMCNC: 33.7 % — SIGNIFICANT CHANGE UP (ref 32–36)
MCV RBC AUTO: 89.7 FL — SIGNIFICANT CHANGE UP (ref 80–100)
MONOCYTES # BLD AUTO: 0.94 K/UL — HIGH (ref 0–0.9)
MONOCYTES NFR BLD AUTO: 7.9 % — SIGNIFICANT CHANGE UP (ref 2–14)
NEUTROPHILS # BLD AUTO: 7.51 K/UL — HIGH (ref 1.8–7.4)
NEUTROPHILS NFR BLD AUTO: 62.8 % — SIGNIFICANT CHANGE UP (ref 43–77)
NRBC # FLD: 0 K/UL — SIGNIFICANT CHANGE UP (ref 0–0)
PLATELET # BLD AUTO: 266 K/UL — SIGNIFICANT CHANGE UP (ref 150–400)
PMV BLD: 8.5 FL — SIGNIFICANT CHANGE UP (ref 7–13)
POTASSIUM SERPL-MCNC: 4.5 MMOL/L — SIGNIFICANT CHANGE UP (ref 3.5–5.3)
POTASSIUM SERPL-SCNC: 4.5 MMOL/L — SIGNIFICANT CHANGE UP (ref 3.5–5.3)
PROT SERPL-MCNC: 7 G/DL — SIGNIFICANT CHANGE UP (ref 6–8.3)
RBC # BLD: 5.16 M/UL — SIGNIFICANT CHANGE UP (ref 4.2–5.8)
RBC # FLD: 14 % — SIGNIFICANT CHANGE UP (ref 10.3–14.5)
SODIUM SERPL-SCNC: 139 MMOL/L — SIGNIFICANT CHANGE UP (ref 135–145)
TROPONIN T, HIGH SENSITIVITY: 18 NG/L — SIGNIFICANT CHANGE UP (ref ?–14)
WBC # BLD: 11.96 K/UL — HIGH (ref 3.8–10.5)
WBC # FLD AUTO: 11.96 K/UL — HIGH (ref 3.8–10.5)

## 2020-08-29 PROCEDURE — 93010 ELECTROCARDIOGRAM REPORT: CPT

## 2020-08-29 PROCEDURE — 72125 CT NECK SPINE W/O DYE: CPT | Mod: 26

## 2020-08-29 PROCEDURE — 99284 EMERGENCY DEPT VISIT MOD MDM: CPT | Mod: 25

## 2020-08-29 PROCEDURE — 71045 X-RAY EXAM CHEST 1 VIEW: CPT | Mod: 26

## 2020-08-29 PROCEDURE — 70450 CT HEAD/BRAIN W/O DYE: CPT | Mod: 26

## 2020-08-29 PROCEDURE — 72100 X-RAY EXAM L-S SPINE 2/3 VWS: CPT | Mod: 26

## 2020-08-29 PROCEDURE — 99223 1ST HOSP IP/OBS HIGH 75: CPT

## 2020-08-29 PROCEDURE — 99231 SBSQ HOSP IP/OBS SF/LOW 25: CPT

## 2020-08-29 PROCEDURE — 72170 X-RAY EXAM OF PELVIS: CPT | Mod: 26

## 2020-08-29 RX ORDER — OLANZAPINE 15 MG/1
2.5 TABLET, FILM COATED ORAL EVERY 6 HOURS
Refills: 0 | Status: DISCONTINUED | OUTPATIENT
Start: 2020-08-29 | End: 2020-08-30

## 2020-08-29 RX ORDER — OXYCODONE AND ACETAMINOPHEN 5; 325 MG/1; MG/1
1 TABLET ORAL ONCE
Refills: 0 | Status: DISCONTINUED | OUTPATIENT
Start: 2020-08-29 | End: 2020-08-29

## 2020-08-29 RX ADMIN — LAMOTRIGINE 25 MILLIGRAM(S): 25 TABLET, ORALLY DISINTEGRATING ORAL at 08:21

## 2020-08-29 RX ADMIN — DULOXETINE HYDROCHLORIDE 80 MILLIGRAM(S): 30 CAPSULE, DELAYED RELEASE ORAL at 09:45

## 2020-08-29 RX ADMIN — OXYCODONE AND ACETAMINOPHEN 1 TABLET(S): 5; 325 TABLET ORAL at 14:36

## 2020-08-29 RX ADMIN — LAMOTRIGINE 200 MILLIGRAM(S): 25 TABLET, ORALLY DISINTEGRATING ORAL at 08:21

## 2020-08-29 RX ADMIN — MAGNESIUM OXIDE 400 MG ORAL TABLET 400 MILLIGRAM(S): 241.3 TABLET ORAL at 09:47

## 2020-08-29 RX ADMIN — OLANZAPINE 2.5 MILLIGRAM(S): 15 TABLET, FILM COATED ORAL at 17:38

## 2020-08-29 RX ADMIN — Medication 30 MILLIGRAM(S): at 08:21

## 2020-08-29 RX ADMIN — Medication 0.5 MILLIGRAM(S): at 15:22

## 2020-08-29 RX ADMIN — Medication 81 MILLIGRAM(S): at 08:21

## 2020-08-29 RX ADMIN — LOSARTAN POTASSIUM 50 MILLIGRAM(S): 100 TABLET, FILM COATED ORAL at 08:21

## 2020-08-29 RX ADMIN — PANTOPRAZOLE SODIUM 40 MILLIGRAM(S): 20 TABLET, DELAYED RELEASE ORAL at 08:21

## 2020-08-29 RX ADMIN — Medication 50 MILLIGRAM(S): at 08:21

## 2020-08-29 RX ADMIN — Medication 650 MILLIGRAM(S): at 01:30

## 2020-08-29 RX ADMIN — Medication 650 MILLIGRAM(S): at 00:11

## 2020-08-29 RX ADMIN — Medication 650 MILLIGRAM(S): at 09:45

## 2020-08-29 RX ADMIN — Medication 0.5 MILLIGRAM(S): at 09:45

## 2020-08-29 RX ADMIN — Medication 650 MILLIGRAM(S): at 10:20

## 2020-08-29 RX ADMIN — Medication 0.5 MILLIGRAM(S): at 03:38

## 2020-08-29 NOTE — ED ADULT NURSE NOTE - CHIEF COMPLAINT QUOTE
pt from [facility] reportable had a fall  vs syncope s/p zyprexa injection also had PPM one week ago placed at [facility]. struck his head and reports LOC after striking head. no blood thinners. c-collar in place by EMS pt moving all extremities

## 2020-08-29 NOTE — ED PROVIDER NOTE - NS ED ROS FT
Gen: No fever, No chills  Eyes: No vision changes  ENT: No congestion, sore throat  Resp: No cough or SOB  Cardiovascular: No chest pain or palpitations  GI: No abdominal pain, nausea/vomiting, diarrhea, constipation  :  No change in urine output or frequency; no dysuria  MS: left neck and lower back pain  Skin: No rashes  Neuro: No headache; No numbness or weakness  Remainder negative, except as per the HPI

## 2020-08-29 NOTE — CHART NOTE - NSCHARTNOTEFT_GEN_A_CORE
[facility] Inpatient to ED Transfer Summary  ****[place] [id number] TO UPDATE ON PATIENT STATUS******      Reason for Transfer/Medical Summary: Patient is a 66 y/o M with a PPHx of bipolar disorder and a PMHx of HTN, and MI 2 weeks ago s/p AICD placement 1 week ago who had unwitnessed fall on psychiatric unit during episode of agitation and disorganization. Patient reported earlier having some neck pain 2/2 over-exertion of L arm which responded to x1 dose of Percocet. Patient has been irritable, demanding discharge and claimed that he fell and hit his head while trying to see assistance.     On exam, patient is too disorganized and agitated to provide hx of mechanism of fall or ROS. Continues to endorse neck pain and states that he cannot walk. Denies bowel/bladder incontinence, states that prior to fall he was able to move legs. Of note, patient received IM Zyprexa 2.5mg at 1738.       PAST MEDICAL & SURGICAL HISTORY:  Brain bleed  Urinary retention  Renal failure: for 1 day in 2004 secomndary to lithium  BPH (benign prostatic hypertrophy)  PTSD (post-traumatic stress disorder)  Anxiety  HTN (hypertension)  S/P ear surgery: tube placed right ear  S/P cholecystectomy  H/O oral surgery  History of tonsillectomy      Allergies    No Known Allergies    Intolerances        MEDICATIONS  (STANDING):  aspirin  chewable 81 milliGRAM(s) Oral daily  busPIRone 30 milliGRAM(s) Oral every 12 hours  calcium carbonate    500 mG (Tums) Chewable 3 Tablet(s) Chew daily  DULoxetine 80 milliGRAM(s) Oral daily  lamoTRIgine 200 milliGRAM(s) Oral daily  lamoTRIgine 25 milliGRAM(s) Oral daily  losartan 50 milliGRAM(s) Oral daily  magnesium oxide 400 milliGRAM(s) Oral daily  metoprolol succinate ER 50 milliGRAM(s) Oral every 12 hours  pantoprazole    Tablet 40 milliGRAM(s) Oral before breakfast    MEDICATIONS  (PRN):  acetaminophen   Tablet .. 650 milliGRAM(s) Oral every 4 hours PRN Mild Pain (1 - 3), Moderate Pain (4 - 6), Severe Pain (7 - 10)  clonazePAM  Tablet 0.5 milliGRAM(s) Oral every 6 hours PRN anxiety  OLANZapine Injectable 2.5 milliGRAM(s) IntraMuscular every 6 hours PRN SEVERE AGITATION  OLANZapine Injectable 2.5 milliGRAM(s) IntraMuscular every 6 hours PRN SEVERE AGITATION      Vital Signs Last 24 Hrs  T(C): 36.4 (29 Aug 2020 15:40), Max: 36.7 (29 Aug 2020 05:47)  T(F): 97.6 (29 Aug 2020 15:40), Max: 98.1 (29 Aug 2020 05:47)  HR 71   B/P 145/92  RR 18  SpO2: --  CAPILLARY BLOOD GLUCOSE            PHYSICAL EXAM:  GENERAL: NAD, well-developed  HEAD:  Atraumatic, Normocephalic  EYES: EOMI, PERRLA, conjunctiva and sclera clear  NECK: Supple, No JVD  CHEST/LUNG: Clear to auscultation bilaterally; No wheeze  HEART: Regular rate and rhythm; No murmurs, rubs, or gallops  ABDOMEN: Soft, Nontender, Nondistended; Bowel sounds present  EXTREMITIES:  2+ Peripheral Pulses, No clubbing, cyanosis, or edema. Pt wiggles toes (although states he cannot during exam)   PSYCH: AAOx3  NEUROLOGY: non-focal  SKIN: No rashes or lesions    LABS:                    Psychiatry Section: 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 80 years and two osteopathic medical student tenants, unemployed, non-caregiver, prior psychiatric diagnoses of Bipolar I d/o, PTSD, polysubstance abuse with +utox for opiate with elevated BAL, 3 prior inpatient psychiatric hospitalizations (most recently at [facility] in 2019 for suicide attempt), hx of suicidality with 1 prior documented suicide attempt in 2019 via BDZ OD, no hx of NSSIB, no hx of violence/aggression, no legal issues, prior hx of polysubstance abuse including alcohol, marijuana, and amphetamines, no documented withdrawal episodes, PMH significant for MI 2 weeks ago with ICD implantation, hearing loss, obesity, and HTN admitted to [facility] for suicidal ideation, 2/2 bipolar depression.     Psychiatric Summary/[facility] admitting diagnosis: Bipolar Disorder     Psychiatric Recommendations: Continue standing meds:      aspirin  chewable 81 milliGRAM(s) Oral daily  busPIRone 30 milliGRAM(s) Oral every 12 hours  calcium carbonate    500 mG (Tums) Chewable 3 Tablet(s) Chew daily  DULoxetine 80 milliGRAM(s) Oral daily  lamoTRIgine 200 milliGRAM(s) Oral daily  lamoTRIgine 25 milliGRAM(s) Oral daily  losartan 50 milliGRAM(s) Oral daily  magnesium oxide 400 milliGRAM(s) Oral daily  metoprolol succinate ER 50 milliGRAM(s) Oral every 12 hours  pantoprazole    Tablet 40 milliGRAM(s) Oral before breakfast    Observation status (check one):   (X) Constant Observation  ( ) Enhanced care  ( ) Routine checks    Risk Status (check all that apply if present):  ( ) at risk for suicide/self-injury  ( ) at risk for aggressive behavior  (X) at risk for elopement  ( ) other risk:

## 2020-08-29 NOTE — ED PROVIDER NOTE - NSFOLLOWUPINSTRUCTIONS_ED_ALL_ED_FT
Get a follow up MRI of the Cervical Spine as an outpatient to follow up with the lesion found on the C5 vertebra.     Seek immediate medical attention for new or worsening symptoms.

## 2020-08-29 NOTE — ED PROVIDER NOTE - PATIENT PORTAL LINK FT
You can access the FollowMyHealth Patient Portal offered by [facility] by registering at the following website: [email or web address]. By joining [facility]’s FollowMyHealth portal, you will also be able to view your health information using other applications (apps) compatible with our system.

## 2020-08-29 NOTE — ED PROVIDER NOTE - PHYSICAL EXAMINATION
Gen: AAOx3, non-toxic  Head: NCAT  HEENT: EOMI, oral mucosa moist, normal conjunctiva  Lung: CTAB, no respiratory distress, no wheezes/rhonchi/rales B/L, speaking in full sentences  CV: RRR, no murmurs, rubs or gallops  Abd: soft, NTND, no guarding, no CVA tenderness  MSK: no visible deformities  Neuro: No focal sensory or motor deficits. CN2-12 intact   Skin: Warm, well perfused. PAcemaker with recent surgical wound over it.   Psych: anxious  ~Jason Bert PGY3

## 2020-08-29 NOTE — ED PROVIDER NOTE - CLINICAL SUMMARY MEDICAL DECISION MAKING FREE TEXT BOX
65y male presenting after a fall. +head trauma, +neck pain. Will get ct head and neck. XR of pelvis and lumbar spine. Check labs, tropo, ekg. 65y male presenting after a fall. +head trauma, +neck pain. Will get ct head and neck. XR of pelvis and lumbar spine. Check labs, tropo, ekg.  Andrea SESAY: pt had EP eval for AICD interrogation, no events noted per cards fellow.

## 2020-08-29 NOTE — ED ADULT NURSE NOTE - OBJECTIVE STATEMENT
Pt. is a 65 y.o. A&OX4, ambulatory at baseline. Pt. brought from [facility] after having a fall. Pt. states he was given a medication that made him dizzy and hit the left side of his head. Pt. denies any dizziness, headaches, visual changes, SOB, or chest pain. Respirations even and unlabored. Pt. also states he had a pacemaker placed at [facility] one week ago. PHx of PTSD, anxiety, and HTN. VS as noted. MD at bedside. Comfort measures in place. Awaiting further orders. Will continue to monitor.

## 2020-08-29 NOTE — ED PROVIDER NOTE - ATTENDING CONTRIBUTION TO CARE
Patient is a 66 yo M with history of bipolar disease, currently admitted to [facility] for treatment, sent to the emergency department for evaluation after fall. Patient was reportedly medicated for agitation, reports feeling dizzy and falling and hitting his head. He reports hitting the back of his head, falling onto his back and being unable to move upon waking. He is c/o neck pain, low back pain. No chest pain, shortness of breath. Denies fevers, chills, cough, nausea, vomiting. Patient has an AICD, recently placed.     VS noted  Gen. no acute distress, Non toxic   HEENT: EOMI, mmm, atraumatic  Spine: no C-spine tenderness, no T-spine tenderness, + mild lumbar tenderness  Lungs: CTAB/L no C/ W /R   CVS: RRR   Abd; Soft non tender, non distended   Ext: no edema  Skin: no rash  Neuro: awake, alert, non focal clear speech  a/p: s/p fall - plan for ct head, ct c-spine, AICD interrogation, labs and reassess.   - Andrea SESAY

## 2020-08-29 NOTE — ED PROVIDER NOTE - OBJECTIVE STATEMENT
Andrea SESAY: Patient is a 66 yo 65y male with PMH of bipolar and AICD, currently inpatient at [facility] for SI presenting after a fall. Was given IM medication for agitation today, states after the medical he became dizzy and fell while standing in his doorway. States +LOC, and head trauma and that he had difficulty moving his limbs for a few minutes. Now has left sided neck pain. Denies current numbness, weakness, tingling, dizziness, chest pain, nausea, vomiting.

## 2020-08-29 NOTE — ED PROVIDER NOTE - PROGRESS NOTE DETAILS
[phone], Paul Cain () Jason Noel, PGY-3: patient has no C5 tenderness to correlate with CT findings, recommended f/u MR. C-Collar cleared, >45degree ROM to left and right, no midline c-spine tenderness.

## 2020-08-29 NOTE — ED ADULT NURSE NOTE - INTERVENTIONS DEFINITIONS
Stretcher in lowest position, wheels locked, appropriate side rails in place/Call bell, personal items and telephone within reach/Instruct patient to call for assistance/Non-slip footwear when patient is off stretcher/Physically safe environment: no spills, clutter or unnecessary equipment

## 2020-08-29 NOTE — ED ADULT TRIAGE NOTE - CHIEF COMPLAINT QUOTE
pt from [facility] reportable had a fall  vs syncope s/p zyprexa injection also had PPM one week ago placed at [facility]. struck his head and reports LOC after striking head. no blood thinners pt from [facility] reportable had a fall  vs syncope s/p zyprexa injection also had PPM one week ago placed at [facility]. struck his head and reports LOC after striking head. no blood thinners. c-collar in place by EMS pt moving all extremities

## 2020-08-30 VITALS
DIASTOLIC BLOOD PRESSURE: 80 MMHG | TEMPERATURE: 98 F | RESPIRATION RATE: 16 BRPM | HEART RATE: 70 BPM | OXYGEN SATURATION: 99 % | SYSTOLIC BLOOD PRESSURE: 130 MMHG

## 2020-08-30 LAB — TROPONIN T, HIGH SENSITIVITY: 19 NG/L — SIGNIFICANT CHANGE UP (ref ?–14)

## 2020-08-30 PROCEDURE — 99232 SBSQ HOSP IP/OBS MODERATE 35: CPT

## 2020-08-30 RX ORDER — CHLORHEXIDINE GLUCONATE 213 G/1000ML
15 SOLUTION TOPICAL
Refills: 0 | Status: DISCONTINUED | OUTPATIENT
Start: 2020-08-30 | End: 2020-09-03

## 2020-08-30 RX ORDER — OLANZAPINE 15 MG/1
1.25 TABLET, FILM COATED ORAL ONCE
Refills: 0 | Status: DISCONTINUED | OUTPATIENT
Start: 2020-08-30 | End: 2020-09-03

## 2020-08-30 RX ORDER — CLONAZEPAM 1 MG
0.5 TABLET ORAL EVERY 8 HOURS
Refills: 0 | Status: DISCONTINUED | OUTPATIENT
Start: 2020-08-30 | End: 2020-09-01

## 2020-08-30 RX ADMIN — Medication 3 TABLET(S): at 08:52

## 2020-08-30 RX ADMIN — Medication 50 MILLIGRAM(S): at 21:59

## 2020-08-30 RX ADMIN — Medication 30 MILLIGRAM(S): at 21:59

## 2020-08-30 RX ADMIN — Medication 0.5 MILLIGRAM(S): at 03:52

## 2020-08-30 RX ADMIN — Medication 50 MILLIGRAM(S): at 08:51

## 2020-08-30 RX ADMIN — Medication 81 MILLIGRAM(S): at 08:52

## 2020-08-30 RX ADMIN — Medication 0.5 MILLIGRAM(S): at 12:54

## 2020-08-30 RX ADMIN — Medication 650 MILLIGRAM(S): at 16:01

## 2020-08-30 RX ADMIN — DULOXETINE HYDROCHLORIDE 80 MILLIGRAM(S): 30 CAPSULE, DELAYED RELEASE ORAL at 08:52

## 2020-08-30 RX ADMIN — Medication 30 MILLIGRAM(S): at 08:52

## 2020-08-30 RX ADMIN — LAMOTRIGINE 200 MILLIGRAM(S): 25 TABLET, ORALLY DISINTEGRATING ORAL at 08:52

## 2020-08-30 RX ADMIN — MAGNESIUM OXIDE 400 MG ORAL TABLET 400 MILLIGRAM(S): 241.3 TABLET ORAL at 10:14

## 2020-08-30 RX ADMIN — Medication 0.5 MILLIGRAM(S): at 21:05

## 2020-08-30 RX ADMIN — LOSARTAN POTASSIUM 50 MILLIGRAM(S): 100 TABLET, FILM COATED ORAL at 08:52

## 2020-08-30 RX ADMIN — Medication 650 MILLIGRAM(S): at 10:14

## 2020-08-30 RX ADMIN — LAMOTRIGINE 25 MILLIGRAM(S): 25 TABLET, ORALLY DISINTEGRATING ORAL at 08:52

## 2020-08-30 RX ADMIN — CHLORHEXIDINE GLUCONATE 15 MILLILITER(S): 213 SOLUTION TOPICAL at 12:53

## 2020-08-30 RX ADMIN — PANTOPRAZOLE SODIUM 40 MILLIGRAM(S): 20 TABLET, DELAYED RELEASE ORAL at 08:51

## 2020-08-30 NOTE — ED ADULT NURSE REASSESSMENT NOTE - NS ED NURSE REASSESS COMMENT FT1
Pt to be discharged and return to [place] [address], report given to [facility] RN Tana. IVL dc'ed. Pt calm and cooperative. Respirations even and unlabored b/l. NAD at this time. [facility] staff member at bedside. Pt to be transported to [place] [address] with [facility] staff and security. Pt to be discharged and return to [place] [address], report given to [facility] BENI Fajardo. IVL dc'ed. Pt calm and cooperative. Respirations even and unlabored b/l. NAD at this time. [facility] staff member at bedside. Per BENI Fajardo, pt okay to be transported with security. Pt to be transported to [place] [address] with [facility] staff and security.

## 2020-08-31 DIAGNOSIS — K06.9 DISORDER OF GINGIVA AND EDENTULOUS ALVEOLAR RIDGE, UNSPECIFIED: ICD-10-CM

## 2020-08-31 DIAGNOSIS — R45.851 SUICIDAL IDEATIONS: ICD-10-CM

## 2020-08-31 DIAGNOSIS — R29.91 UNSPECIFIED SYMPTOMS AND SIGNS INVOLVING THE MUSCULOSKELETAL SYSTEM: ICD-10-CM

## 2020-08-31 PROCEDURE — 99221 1ST HOSP IP/OBS SF/LOW 40: CPT

## 2020-08-31 RX ORDER — CYCLOBENZAPRINE HYDROCHLORIDE 10 MG/1
5 TABLET, FILM COATED ORAL EVERY 24 HOURS
Refills: 0 | Status: DISCONTINUED | OUTPATIENT
Start: 2020-08-31 | End: 2020-09-02

## 2020-08-31 RX ADMIN — PANTOPRAZOLE SODIUM 40 MILLIGRAM(S): 20 TABLET, DELAYED RELEASE ORAL at 08:10

## 2020-08-31 RX ADMIN — LOSARTAN POTASSIUM 50 MILLIGRAM(S): 100 TABLET, FILM COATED ORAL at 09:52

## 2020-08-31 RX ADMIN — Medication 81 MILLIGRAM(S): at 09:51

## 2020-08-31 RX ADMIN — Medication 0.5 MILLIGRAM(S): at 23:30

## 2020-08-31 RX ADMIN — Medication 0.5 MILLIGRAM(S): at 05:19

## 2020-08-31 RX ADMIN — Medication 3 TABLET(S): at 14:07

## 2020-08-31 RX ADMIN — CHLORHEXIDINE GLUCONATE 15 MILLILITER(S): 213 SOLUTION TOPICAL at 11:45

## 2020-08-31 RX ADMIN — Medication 650 MILLIGRAM(S): at 18:35

## 2020-08-31 RX ADMIN — Medication 650 MILLIGRAM(S): at 23:41

## 2020-08-31 RX ADMIN — Medication 0.5 MILLIGRAM(S): at 14:05

## 2020-08-31 RX ADMIN — Medication 50 MILLIGRAM(S): at 09:52

## 2020-08-31 RX ADMIN — LAMOTRIGINE 200 MILLIGRAM(S): 25 TABLET, ORALLY DISINTEGRATING ORAL at 09:52

## 2020-08-31 RX ADMIN — Medication 30 MILLIGRAM(S): at 09:51

## 2020-08-31 RX ADMIN — Medication 30 MILLIGRAM(S): at 20:53

## 2020-08-31 RX ADMIN — Medication 50 MILLIGRAM(S): at 20:53

## 2020-08-31 RX ADMIN — LAMOTRIGINE 25 MILLIGRAM(S): 25 TABLET, ORALLY DISINTEGRATING ORAL at 09:52

## 2020-08-31 RX ADMIN — Medication 650 MILLIGRAM(S): at 19:35

## 2020-08-31 RX ADMIN — DULOXETINE HYDROCHLORIDE 80 MILLIGRAM(S): 30 CAPSULE, DELAYED RELEASE ORAL at 09:52

## 2020-08-31 RX ADMIN — MAGNESIUM OXIDE 400 MG ORAL TABLET 400 MILLIGRAM(S): 241.3 TABLET ORAL at 09:52

## 2020-08-31 RX ADMIN — CYCLOBENZAPRINE HYDROCHLORIDE 5 MILLIGRAM(S): 10 TABLET, FILM COATED ORAL at 17:59

## 2020-08-31 NOTE — CONSULT NOTE ADULT - SUBJECTIVE AND OBJECTIVE BOX
Patient is a 65y Male who is an inpatient at [facility].  Had a fall 2 days ago, complained of L shoulder/neck pain.  CT revealed lucency in the anteroinferior corner of the C5 vertebral body suspicious for fracture for which ortho was consulted. Denies numbness/tingling/paresthesias/weakness. Denies bowel/bladder incontinence. Denies fevers/chills.   Patient had a pacemaker placed 2 weeks ago, and is complaining of some tenderness near operative site that radiates to his left shoulder. He is able to range his shoulder with minimal pain/difficulties.     PAST MEDICAL & SURGICAL HISTORY:  Brain bleed  Urinary retention  Renal failure: for 1 day in 2004 secomndary to lithium  BPH (benign prostatic hypertrophy)  PTSD (post-traumatic stress disorder)  Anxiety  Bipolar disorder  HTN (hypertension)  DM (diabetes mellitus)  S/P ear surgery: tube placed right ear  S/P cholecystectomy  H/O oral surgery  History of tonsillectomy      Vital Signs  T(C): 36.7 (08-31-20 @ 05:39), Max: 36.7 (08-31-20 @ 05:39)  T(F): 98.1 (08-31-20 @ 05:39), Max: 98.1 (08-31-20 @ 05:39)  HR: --  BP: --  BP(mean): --  RR: --  SpO2: --    Gen: NAD    Spine PE:  C-spine skin intact  No gross deformity  No midline TTP C/T/L/S spine  No bony step offs  No paraspinal muscle ttp/hypertonicity   Negative clonus  Negative babinski  Negative weir  No saddle anesthesia    Motor:                   C5                C6              C7               C8           T1   R            5/5                5/5            5/5             5/5          5/5  L             5/5               /5             5/5             5/5          5/5                L2             L3             L4               L5            S1  R         5/5           5/5          5/5             5/5           5/5  L          5/5          5/5           5/5             5/5           5/5    Sensory:            C5         C6         C7      C8       T1        (0=absent, 1=impaired, 2=normal, NT=not testable)  R         2            2           2        2         2  L          2            2           2        2         2               L2          L3         L4      L5       S1         (0=absent, 1=impaired, 2=normal, NT=not testable)  R         2            2            2        2        2  L          2            2           2        2         2    Imaging:   CT: Lucency in the anteroinferior corner of the C5 vertebral body suspicious for fracture     A/P: 65y Male with concerning findings on CT for C5/C6.  C-collar for now  Pain control / muscle relaxants / hot packs as needed  Will need MRI when cleared from pacemaker placement window  Will discuss w/ attending and update team ([id number]) w/ any changes Patient is a 65y Male who is an inpatient at [facility].  Had a fall 2 days ago, complained of L shoulder/neck pain.  CT revealed lucency in the anteroinferior corner of the C5 vertebral body suspicious for fracture for which ortho was consulted. Denies numbness/tingling/paresthesias/weakness. Denies bowel/bladder incontinence. Denies fevers/chills.   Patient had a pacemaker placed 2 weeks ago, and is complaining of some tenderness near operative site that radiates to his left shoulder. He is able to range his shoulder with minimal pain/difficulties.     PAST MEDICAL & SURGICAL HISTORY:  Brain bleed  Urinary retention  Renal failure: for 1 day in 2004 secomndary to lithium  BPH (benign prostatic hypertrophy)  PTSD (post-traumatic stress disorder)  Anxiety  Bipolar disorder  HTN (hypertension)  DM (diabetes mellitus)  S/P ear surgery: tube placed right ear  S/P cholecystectomy  H/O oral surgery  History of tonsillectomy      Vital Signs  T(C): 36.7 (08-31-20 @ 05:39), Max: 36.7 (08-31-20 @ 05:39)  T(F): 98.1 (08-31-20 @ 05:39), Max: 98.1 (08-31-20 @ 05:39)  HR: --  BP: --  BP(mean): --  RR: --  SpO2: --    Gen: NAD    Spine PE:  C-spine skin intact  No gross deformity  No midline TTP C/T/L/S spine  No bony step offs  No paraspinal muscle ttp/hypertonicity   Negative clonus  Negative babinski  Negative weir  No saddle anesthesia    Motor:                   C5                C6              C7               C8           T1   R            5/5                5/5            5/5             5/5          5/5  L             5/5               /5             5/5             5/5          5/5                L2             L3             L4               L5            S1  R         5/5           5/5          5/5             5/5           5/5  L          5/5          5/5           5/5             5/5           5/5    Sensory:            C5         C6         C7      C8       T1        (0=absent, 1=impaired, 2=normal, NT=not testable)  R         2            2           2        2         2  L          2            2           2        2         2               L2          L3         L4      L5       S1         (0=absent, 1=impaired, 2=normal, NT=not testable)  R         2            2            2        2        2  L          2            2           2        2         2    Imaging:   CT: Lucency in the anteroinferior corner of the C5 vertebral body suspicious for fracture     A/P: 65y Male with concerning findings on CT for C5/C6.  C-collar f  Pain control / muscle relaxants / hot packs as needed  Will need MRI when cleared from pacemaker placement window Patient is a 65y Male who is an inpatient at [facility].  Had a fall 2 days ago, complained of L shoulder/neck pain.  CT revealed lucency in the anteroinferior corner of the C5 vertebral body suspicious for fracture for which ortho was consulted. Denies numbness/tingling/paresthesias/weakness. Denies bowel/bladder incontinence. Denies fevers/chills.   Patient had a pacemaker placed 2 weeks ago, and is complaining of some tenderness near operative site that radiates to his left shoulder. He is able to range his shoulder with minimal pain/difficulties.     PAST MEDICAL & SURGICAL HISTORY:  Brain bleed  Urinary retention  Renal failure: for 1 day in 2004 secomndary to lithium  BPH (benign prostatic hypertrophy)  PTSD (post-traumatic stress disorder)  Anxiety  Bipolar disorder  HTN (hypertension)  DM (diabetes mellitus)  S/P ear surgery: tube placed right ear  S/P cholecystectomy  H/O oral surgery  History of tonsillectomy      Vital Signs  T(C): 36.7 (08-31-20 @ 05:39), Max: 36.7 (08-31-20 @ 05:39)  T(F): 98.1 (08-31-20 @ 05:39), Max: 98.1 (08-31-20 @ 05:39)  HR: --  BP: --  BP(mean): --  RR: --  SpO2: --    Gen: NAD    Spine PE:  C-spine skin intact  No gross deformity  No midline TTP C/T/L/S spine  No bony step offs  No paraspinal muscle ttp/hypertonicity   Negative clonus  Negative babinski  Negative weir  No saddle anesthesia    Motor:                   C5                C6              C7               C8           T1   R            5/5                5/5            5/5             5/5          5/5  L             5/5               /5             5/5             5/5          5/5                L2             L3             L4               L5            S1  R         5/5           5/5          5/5             5/5           5/5  L          5/5          5/5           5/5             5/5           5/5    Sensory:            C5         C6         C7      C8       T1        (0=absent, 1=impaired, 2=normal, NT=not testable)  R         2            2           2        2         2  L          2            2           2        2         2               L2          L3         L4      L5       S1         (0=absent, 1=impaired, 2=normal, NT=not testable)  R         2            2            2        2        2  L          2            2           2        2         2    Imaging:   CT: Lucency in the anteroinferior corner of the C5 vertebral body suspicious for fracture     A/P: 65y Male with concerning findings on CT for C5/C6.  C-collar   Pain control / muscle relaxants / hot packs as needed  Will need MRI when cleared from pacemaker placement window

## 2020-08-31 NOTE — CONSULT NOTE ADULT - PROBLEM SELECTOR RECOMMENDATION 9
- concern for disease process at c5-6 - appreciate ortho consult  - will need C-collar for now, discussed with pt and psychiatry team  - will need MRI - primary team to obtain device info  - will discuss with EP regarding interrogation to see if pt is dependent on AICD prior to MRI

## 2020-08-31 NOTE — CONSULT NOTE ADULT - ATTENDING COMMENTS
Addendum:    Called Dr. Davidson office - [phone].  Pt had a [employer] 7120Q-58 lead and a [employer] Assura generator placed.  Will discuss with psych and EP. Addendum:    Called Dr. Davidson office - [phone].  Pt had a [employer] 7120Q-58 lead and a [employer] Assura generator placed.  Discussed with psych.

## 2020-08-31 NOTE — CONSULT NOTE ADULT - ASSESSMENT
65M with htn, hearing loss, recent AICD placed at [facility] now at [facility] for SI s/p fall and now with concern for C5-6 fracture.

## 2020-08-31 NOTE — CONSULT NOTE ADULT - SUBJECTIVE AND OBJECTIVE BOX
HPI:   65M with htn, hearing loss, recent AICD placed at [facility] now at [facility] for SI.  Pt had a fall a few days ago at [facility] and he was transferred to [facility] ER for workup.  On imaging there is concern for a fracture at c5-6.  Pt also complains about pain on him gums after the fall because his dentures were displaced and is requesting a dental eval for his gum posts.  Pt complains of feeling tingling in all of his fingertips since the fall and numbness to the sole of his L foot.  He denies weakness.  pt was seen ambulating comfortably in the hallway.    He does not know the specifics of why he needed an AICD - states his heart rate was very fast but unable to provide more details.  Denies chest pain or sob or anymore episodes of dizziness.      PAST MEDICAL & SURGICAL HISTORY:  Brain bleed  Urinary retention  Renal failure: for 1 day in 2004 secomndary to lithium  BPH (benign prostatic hypertrophy)  PTSD (post-traumatic stress disorder)  Anxiety  HTN (hypertension)  S/P ear surgery: tube placed right ear  S/P cholecystectomy  H/O oral surgery  History of tonsillectomy      Review of Systems:   CONSTITUTIONAL: No fever, weight loss, or fatigue  EYES: No eye pain, visual disturbances, or discharge  ENMT:  No difficulty tinnitus, vertigo; No sinus or throat pain; + hearing loss  NECK: No pain or stiffness  RESPIRATORY: No cough, wheezing, chills or hemoptysis; No shortness of breath  CARDIOVASCULAR: No chest pain, palpitations, dizziness, or leg swelling  GASTROINTESTINAL: No abdominal or epigastric pain. No nausea, vomiting, or hematemesis; No diarrhea or constipation. No melena or hematochezia.  GENITOURINARY: No dysuria, frequency, hematuria, or incontinence  NEUROLOGICAL: No headaches, memory loss; as in HPI  SKIN: No itching, burning, rashes, or lesions   LYMPH NODES: No enlarged glands  ENDOCRINE: No heat or cold intolerance; No hair loss  MUSCULOSKELETAL: as in HPI   ALLERY AND IMMUNOLOGIC: No hives or eczema    Allergies    No Known Allergies    Intolerances        Social History:   homosexual male in one partner for 30+ years; ; nonsmoker    FAMILY HISTORY:  noncontributory     MEDICATIONS  (STANDING):  aspirin  chewable 81 milliGRAM(s) Oral daily  busPIRone 30 milliGRAM(s) Oral every 12 hours  calcium carbonate    500 mG (Tums) Chewable 3 Tablet(s) Chew daily  DULoxetine 80 milliGRAM(s) Oral daily  lamoTRIgine 200 milliGRAM(s) Oral daily  lamoTRIgine 25 milliGRAM(s) Oral daily  losartan 50 milliGRAM(s) Oral daily  magnesium oxide 400 milliGRAM(s) Oral daily  metoprolol succinate ER 50 milliGRAM(s) Oral every 12 hours  pantoprazole    Tablet 40 milliGRAM(s) Oral before breakfast    MEDICATIONS  (PRN):  acetaminophen   Tablet .. 650 milliGRAM(s) Oral every 4 hours PRN Mild Pain (1 - 3), Moderate Pain (4 - 6), Severe Pain (7 - 10)  chlorhexidine 0.12% Liquid 15 milliLiter(s) Swish and Spit two times a day PRN mouth rinse  clonazePAM  Tablet 0.5 milliGRAM(s) Oral every 8 hours PRN anxiety  OLANZapine Injectable 1.25 milliGRAM(s) IntraMuscular once PRN SEVERE AGITATION        CAPILLARY BLOOD GLUCOSE    Vital Signs Last 24 Hrs  T(C): 36.7 (31 Aug 2020 05:39), Max: 36.7 (31 Aug 2020 05:39)  T(F): 98.1 (31 Aug 2020 05:39), Max: 98.1 (31 Aug 2020 05:39)  HR: --  BP: --  BP(mean): --  RR: --  SpO2: --      PHYSICAL EXAM:  GENERAL: NAD, well-developed  HEAD:  Atraumatic, Normocephalic  EYES: EOMI, conjunctiva and sclera clear  NECK: Supple, No JVD  CHEST/LUNG: Clear to auscultation bilaterally; No wheeze  HEART: Regular rate and rhythm; No murmurs  ABDOMEN: Soft, Nontender, Nondistended; Bowel sounds present  EXTREMITIES:  2+ Peripheral Pulses, No clubbing, cyanosis, or edema  PSYCH: AAOx3  NEUROLOGY: decreased hand  - TTP at C6 posteriorly; refuses to raise L arm above shoulder level but states he was told not to do that by the EP physician who put in AICD.    SKIN: No rashes or lesions    LABS:                        15.6   11.96 )-----------( 266      ( 29 Aug 2020 22:30 )             46.3     08-29    139  |  101  |  21  ----------------------------<  103<H>  4.5   |  24  |  1.15    Ca    9.4      29 Aug 2020 22:30    TPro  7.0  /  Alb  4.1  /  TBili  0.4  /  DBili  x   /  AST  42<H>  /  ALT  145<H>  /  AlkPhos  75  08-29      EKG(personally reviewed):    RADIOLOGY & ADDITIONAL TESTS:    Imaging Personally Reviewed:    Consultant(s) Notes Reviewed:      Care Discussed with Consultants/Other Providers:

## 2020-09-01 PROCEDURE — 99232 SBSQ HOSP IP/OBS MODERATE 35: CPT | Mod: GC

## 2020-09-01 RX ORDER — BACITRACIN ZINC 500 UNIT/G
1 OINTMENT IN PACKET (EA) TOPICAL
Refills: 0 | Status: DISCONTINUED | OUTPATIENT
Start: 2020-09-01 | End: 2020-09-03

## 2020-09-01 RX ORDER — CLONAZEPAM 1 MG
0.5 TABLET ORAL EVERY 6 HOURS
Refills: 0 | Status: DISCONTINUED | OUTPATIENT
Start: 2020-09-01 | End: 2020-09-03

## 2020-09-01 RX ADMIN — Medication 650 MILLIGRAM(S): at 06:23

## 2020-09-01 RX ADMIN — Medication 50 MILLIGRAM(S): at 09:11

## 2020-09-01 RX ADMIN — CYCLOBENZAPRINE HYDROCHLORIDE 5 MILLIGRAM(S): 10 TABLET, FILM COATED ORAL at 22:05

## 2020-09-01 RX ADMIN — Medication 650 MILLIGRAM(S): at 04:31

## 2020-09-01 RX ADMIN — LAMOTRIGINE 200 MILLIGRAM(S): 25 TABLET, ORALLY DISINTEGRATING ORAL at 09:11

## 2020-09-01 RX ADMIN — Medication 30 MILLIGRAM(S): at 09:07

## 2020-09-01 RX ADMIN — Medication 650 MILLIGRAM(S): at 14:10

## 2020-09-01 RX ADMIN — Medication 1 APPLICATION(S): at 09:07

## 2020-09-01 RX ADMIN — MAGNESIUM OXIDE 400 MG ORAL TABLET 400 MILLIGRAM(S): 241.3 TABLET ORAL at 09:11

## 2020-09-01 RX ADMIN — DULOXETINE HYDROCHLORIDE 80 MILLIGRAM(S): 30 CAPSULE, DELAYED RELEASE ORAL at 09:11

## 2020-09-01 RX ADMIN — Medication 81 MILLIGRAM(S): at 09:07

## 2020-09-01 RX ADMIN — Medication 0.5 MILLIGRAM(S): at 14:41

## 2020-09-01 RX ADMIN — Medication 0.5 MILLIGRAM(S): at 22:05

## 2020-09-01 RX ADMIN — Medication 650 MILLIGRAM(S): at 15:00

## 2020-09-01 RX ADMIN — Medication 650 MILLIGRAM(S): at 19:30

## 2020-09-01 RX ADMIN — PANTOPRAZOLE SODIUM 40 MILLIGRAM(S): 20 TABLET, DELAYED RELEASE ORAL at 08:41

## 2020-09-01 RX ADMIN — Medication 30 MILLIGRAM(S): at 20:57

## 2020-09-01 RX ADMIN — Medication 0.5 MILLIGRAM(S): at 08:41

## 2020-09-01 RX ADMIN — LOSARTAN POTASSIUM 50 MILLIGRAM(S): 100 TABLET, FILM COATED ORAL at 09:11

## 2020-09-01 RX ADMIN — Medication 1 APPLICATION(S): at 20:57

## 2020-09-01 RX ADMIN — Medication 650 MILLIGRAM(S): at 20:56

## 2020-09-01 RX ADMIN — Medication 50 MILLIGRAM(S): at 20:57

## 2020-09-01 RX ADMIN — Medication 650 MILLIGRAM(S): at 09:11

## 2020-09-01 RX ADMIN — Medication 650 MILLIGRAM(S): at 00:00

## 2020-09-01 RX ADMIN — Medication 650 MILLIGRAM(S): at 00:23

## 2020-09-01 RX ADMIN — CHLORHEXIDINE GLUCONATE 15 MILLILITER(S): 213 SOLUTION TOPICAL at 00:22

## 2020-09-02 PROCEDURE — 99232 SBSQ HOSP IP/OBS MODERATE 35: CPT | Mod: GC

## 2020-09-02 RX ORDER — TRAZODONE HCL 50 MG
50 TABLET ORAL ONCE
Refills: 0 | Status: COMPLETED | OUTPATIENT
Start: 2020-09-02 | End: 2020-09-02

## 2020-09-02 RX ORDER — CYCLOBENZAPRINE HYDROCHLORIDE 10 MG/1
5 TABLET, FILM COATED ORAL THREE TIMES A DAY
Refills: 0 | Status: DISCONTINUED | OUTPATIENT
Start: 2020-09-02 | End: 2020-09-03

## 2020-09-02 RX ADMIN — Medication 650 MILLIGRAM(S): at 18:35

## 2020-09-02 RX ADMIN — LAMOTRIGINE 200 MILLIGRAM(S): 25 TABLET, ORALLY DISINTEGRATING ORAL at 09:16

## 2020-09-02 RX ADMIN — CYCLOBENZAPRINE HYDROCHLORIDE 5 MILLIGRAM(S): 10 TABLET, FILM COATED ORAL at 20:40

## 2020-09-02 RX ADMIN — Medication 650 MILLIGRAM(S): at 04:42

## 2020-09-02 RX ADMIN — LOSARTAN POTASSIUM 50 MILLIGRAM(S): 100 TABLET, FILM COATED ORAL at 09:16

## 2020-09-02 RX ADMIN — Medication 0.5 MILLIGRAM(S): at 11:30

## 2020-09-02 RX ADMIN — Medication 0.5 MILLIGRAM(S): at 20:40

## 2020-09-02 RX ADMIN — Medication 650 MILLIGRAM(S): at 23:30

## 2020-09-02 RX ADMIN — MAGNESIUM OXIDE 400 MG ORAL TABLET 400 MILLIGRAM(S): 241.3 TABLET ORAL at 09:16

## 2020-09-02 RX ADMIN — Medication 50 MILLIGRAM(S): at 22:58

## 2020-09-02 RX ADMIN — Medication 650 MILLIGRAM(S): at 22:49

## 2020-09-02 RX ADMIN — CYCLOBENZAPRINE HYDROCHLORIDE 5 MILLIGRAM(S): 10 TABLET, FILM COATED ORAL at 11:30

## 2020-09-02 RX ADMIN — DULOXETINE HYDROCHLORIDE 80 MILLIGRAM(S): 30 CAPSULE, DELAYED RELEASE ORAL at 09:16

## 2020-09-02 RX ADMIN — Medication 1 APPLICATION(S): at 10:04

## 2020-09-02 RX ADMIN — PANTOPRAZOLE SODIUM 40 MILLIGRAM(S): 20 TABLET, DELAYED RELEASE ORAL at 08:43

## 2020-09-02 RX ADMIN — Medication 50 MILLIGRAM(S): at 09:16

## 2020-09-02 RX ADMIN — Medication 0.5 MILLIGRAM(S): at 04:43

## 2020-09-02 RX ADMIN — Medication 30 MILLIGRAM(S): at 20:59

## 2020-09-02 RX ADMIN — Medication 650 MILLIGRAM(S): at 15:00

## 2020-09-02 RX ADMIN — Medication 81 MILLIGRAM(S): at 09:15

## 2020-09-02 RX ADMIN — Medication 30 MILLIGRAM(S): at 09:16

## 2020-09-02 RX ADMIN — Medication 650 MILLIGRAM(S): at 14:20

## 2020-09-02 RX ADMIN — Medication 650 MILLIGRAM(S): at 19:15

## 2020-09-02 RX ADMIN — Medication 50 MILLIGRAM(S): at 01:19

## 2020-09-02 RX ADMIN — Medication 50 MILLIGRAM(S): at 21:00

## 2020-09-02 RX ADMIN — Medication 1 APPLICATION(S): at 21:00

## 2020-09-02 NOTE — CHART NOTE - NSCHARTNOTEFT_GEN_A_CORE
66 yo M seen after pt requested more pain meds. Pt seen along with staff in the day room. He reports history of having chronic back and neck pain. Pt received flexeril 5mg and Tylenol 650 mg along with heat pack to help alleviate his pain. He reports not being able to sleep due to the pain. Pt offered sleeping medication and he was advised that he has PRN for Tylenol ordered in 4 hours. Pt seeking stronger pain meds, but agreed to current treatment plan for tonight. Staff advised to continue monitoring. Primary team to f/u in AM.

## 2020-09-03 VITALS — TEMPERATURE: 97 F

## 2020-09-03 RX ORDER — LAMOTRIGINE 25 MG/1
1 TABLET, ORALLY DISINTEGRATING ORAL
Qty: 0 | Refills: 0 | DISCHARGE
Start: 2020-09-03

## 2020-09-03 RX ORDER — METOPROLOL TARTRATE 50 MG
1 TABLET ORAL
Qty: 0 | Refills: 0 | DISCHARGE
Start: 2020-09-03

## 2020-09-03 RX ORDER — LOSARTAN POTASSIUM 100 MG/1
1 TABLET, FILM COATED ORAL
Qty: 0 | Refills: 0 | DISCHARGE
Start: 2020-09-03

## 2020-09-03 RX ORDER — DULOXETINE HYDROCHLORIDE 30 MG/1
2 CAPSULE, DELAYED RELEASE ORAL
Qty: 0 | Refills: 0 | DISCHARGE
Start: 2020-09-03

## 2020-09-03 RX ORDER — CYCLOBENZAPRINE HYDROCHLORIDE 10 MG/1
1 TABLET, FILM COATED ORAL
Qty: 0 | Refills: 0 | DISCHARGE
Start: 2020-09-03

## 2020-09-03 RX ORDER — PANTOPRAZOLE SODIUM 20 MG/1
1 TABLET, DELAYED RELEASE ORAL
Qty: 0 | Refills: 0 | DISCHARGE
Start: 2020-09-03

## 2020-09-03 RX ADMIN — Medication 50 MILLIGRAM(S): at 08:36

## 2020-09-03 RX ADMIN — Medication 650 MILLIGRAM(S): at 05:00

## 2020-09-03 RX ADMIN — MAGNESIUM OXIDE 400 MG ORAL TABLET 400 MILLIGRAM(S): 241.3 TABLET ORAL at 08:36

## 2020-09-03 RX ADMIN — Medication 30 MILLIGRAM(S): at 08:29

## 2020-09-03 RX ADMIN — LOSARTAN POTASSIUM 50 MILLIGRAM(S): 100 TABLET, FILM COATED ORAL at 08:36

## 2020-09-03 RX ADMIN — Medication 0.5 MILLIGRAM(S): at 04:19

## 2020-09-03 RX ADMIN — DULOXETINE HYDROCHLORIDE 80 MILLIGRAM(S): 30 CAPSULE, DELAYED RELEASE ORAL at 08:36

## 2020-09-03 RX ADMIN — PANTOPRAZOLE SODIUM 40 MILLIGRAM(S): 20 TABLET, DELAYED RELEASE ORAL at 08:36

## 2020-09-03 RX ADMIN — Medication 650 MILLIGRAM(S): at 04:19

## 2020-09-03 RX ADMIN — Medication 81 MILLIGRAM(S): at 08:29

## 2020-09-03 RX ADMIN — LAMOTRIGINE 200 MILLIGRAM(S): 25 TABLET, ORALLY DISINTEGRATING ORAL at 08:36

## 2020-09-03 RX ADMIN — CYCLOBENZAPRINE HYDROCHLORIDE 5 MILLIGRAM(S): 10 TABLET, FILM COATED ORAL at 04:19

## 2020-09-14 NOTE — H&P PST ADULT - SURGICAL SITE INCISION
-- DO NOT REPLY / DO NOT REPLY ALL --  -- Message is from the [facility]--    COVID-19 Universal Screening: Negative    General Patient Message      Reason for Call: Patient's mother is calling to schedule to fulfill immunization requests.     Caller Information       Type Contact Phone    09/14/2020 03:05 PM CDT Phone (Incoming) GRACE NELLY (Emergency Contact) [phone]          Alternative phone number: none    Turnaround time given to caller:   \"This message will be sent to [state Provider's name]. The clinical team will fulfill your request as soon as they review your message.\"     no

## 2020-10-02 ENCOUNTER — OUTPATIENT (OUTPATIENT)
Dept: OUTPATIENT SERVICES | Facility: HOSPITAL | Age: 65
LOS: 1 days | Discharge: HOME | End: 2020-10-02
Payer: MEDICARE

## 2020-10-02 DIAGNOSIS — Z98.890 OTHER SPECIFIED POSTPROCEDURAL STATES: Chronic | ICD-10-CM

## 2020-10-02 DIAGNOSIS — Z90.49 ACQUIRED ABSENCE OF OTHER SPECIFIED PARTS OF DIGESTIVE TRACT: Chronic | ICD-10-CM

## 2020-10-02 PROCEDURE — 90792 PSYCH DIAG EVAL W/MED SRVCS: CPT

## 2020-10-05 DIAGNOSIS — G47.33 OBSTRUCTIVE SLEEP APNEA (ADULT) (PEDIATRIC): ICD-10-CM

## 2020-10-05 DIAGNOSIS — E11.9 TYPE 2 DIABETES MELLITUS WITHOUT COMPLICATIONS: ICD-10-CM

## 2020-10-05 DIAGNOSIS — I10 ESSENTIAL (PRIMARY) HYPERTENSION: ICD-10-CM

## 2020-10-05 DIAGNOSIS — F19.20 OTHER PSYCHOACTIVE SUBSTANCE DEPENDENCE, UNCOMPLICATED: ICD-10-CM

## 2020-10-05 DIAGNOSIS — F31.32 BIPOLAR DISORDER, CURRENT EPISODE DEPRESSED, MODERATE: ICD-10-CM

## 2021-03-22 ENCOUNTER — EMERGENCY (EMERGENCY)
Facility: HOSPITAL | Age: 66
LOS: 1 days | Discharge: HOME | End: 2021-03-22
Attending: EMERGENCY MEDICINE | Admitting: EMERGENCY MEDICINE
Payer: MEDICARE

## 2021-03-22 VITALS
SYSTOLIC BLOOD PRESSURE: 127 MMHG | HEART RATE: 72 BPM | TEMPERATURE: 98 F | HEIGHT: 70 IN | DIASTOLIC BLOOD PRESSURE: 78 MMHG | OXYGEN SATURATION: 95 % | RESPIRATION RATE: 18 BRPM

## 2021-03-22 DIAGNOSIS — Z90.49 ACQUIRED ABSENCE OF OTHER SPECIFIED PARTS OF DIGESTIVE TRACT: Chronic | ICD-10-CM

## 2021-03-22 DIAGNOSIS — Z98.890 OTHER SPECIFIED POSTPROCEDURAL STATES: Chronic | ICD-10-CM

## 2021-03-22 PROCEDURE — 99284 EMERGENCY DEPT VISIT MOD MDM: CPT | Mod: CS,GC

## 2021-03-22 NOTE — ED ADULT TRIAGE NOTE - CHIEF COMPLAINT QUOTE
pt requesting alcohol detox. Pt states last had half liter wine and 3 shots of bennie approx 1 hour ago. Pt denies SI/HI/AH/VH or physical complaints. Denies hx withdrawal seizures, Pt hx of bipolar pt requesting alcohol detox. Pt states last had half liter wine and 3 shots of bennie approx 1 hour ago. Pt denies SI/HI/AH/VH or physical complaints. Denies hx withdrawal seizures, Pt hx of bipolar. Belongings placed across from 21

## 2021-03-23 VITALS
TEMPERATURE: 98 F | RESPIRATION RATE: 17 BRPM | SYSTOLIC BLOOD PRESSURE: 120 MMHG | DIASTOLIC BLOOD PRESSURE: 75 MMHG | HEART RATE: 71 BPM | OXYGEN SATURATION: 96 %

## 2021-03-23 LAB
ALBUMIN SERPL ELPH-MCNC: 4.1 G/DL — SIGNIFICANT CHANGE UP (ref 3.3–5)
ALP SERPL-CCNC: 68 U/L — SIGNIFICANT CHANGE UP (ref 40–120)
ALT FLD-CCNC: 29 U/L — SIGNIFICANT CHANGE UP (ref 4–41)
ANION GAP SERPL CALC-SCNC: 14 MMOL/L — SIGNIFICANT CHANGE UP (ref 7–14)
APPEARANCE UR: CLEAR — SIGNIFICANT CHANGE UP
AST SERPL-CCNC: 47 U/L — HIGH (ref 4–40)
BASOPHILS # BLD AUTO: 0 K/UL — SIGNIFICANT CHANGE UP (ref 0–0.2)
BASOPHILS NFR BLD AUTO: 0 % — SIGNIFICANT CHANGE UP (ref 0–2)
BILIRUB SERPL-MCNC: 0.8 MG/DL — SIGNIFICANT CHANGE UP (ref 0.2–1.2)
BILIRUB UR-MCNC: NEGATIVE — SIGNIFICANT CHANGE UP
BUN SERPL-MCNC: 12 MG/DL — SIGNIFICANT CHANGE UP (ref 7–23)
CALCIUM SERPL-MCNC: 9.6 MG/DL — SIGNIFICANT CHANGE UP (ref 8.4–10.5)
CHLORIDE SERPL-SCNC: 104 MMOL/L — SIGNIFICANT CHANGE UP (ref 98–107)
CO2 SERPL-SCNC: 20 MMOL/L — LOW (ref 22–31)
COLOR SPEC: YELLOW — SIGNIFICANT CHANGE UP
CREAT SERPL-MCNC: 0.75 MG/DL — SIGNIFICANT CHANGE UP (ref 0.5–1.3)
DIFF PNL FLD: NEGATIVE — SIGNIFICANT CHANGE UP
EOSINOPHIL # BLD AUTO: 0.2 K/UL — SIGNIFICANT CHANGE UP (ref 0–0.5)
EOSINOPHIL NFR BLD AUTO: 2.6 % — SIGNIFICANT CHANGE UP (ref 0–6)
GLUCOSE SERPL-MCNC: 103 MG/DL — HIGH (ref 70–99)
GLUCOSE UR QL: NEGATIVE — SIGNIFICANT CHANGE UP
HCT VFR BLD CALC: 47.4 % — SIGNIFICANT CHANGE UP (ref 39–50)
HGB BLD-MCNC: 16 G/DL — SIGNIFICANT CHANGE UP (ref 13–17)
IANC: 3.61 K/UL — SIGNIFICANT CHANGE UP (ref 1.5–8.5)
KETONES UR-MCNC: NEGATIVE — SIGNIFICANT CHANGE UP
LEUKOCYTE ESTERASE UR-ACNC: NEGATIVE — SIGNIFICANT CHANGE UP
LIDOCAIN IGE QN: 108 U/L — HIGH (ref 7–60)
LYMPHOCYTES # BLD AUTO: 2.41 K/UL — SIGNIFICANT CHANGE UP (ref 1–3.3)
LYMPHOCYTES # BLD AUTO: 31.6 % — SIGNIFICANT CHANGE UP (ref 13–44)
MCHC RBC-ENTMCNC: 31.4 PG — SIGNIFICANT CHANGE UP (ref 27–34)
MCHC RBC-ENTMCNC: 33.8 GM/DL — SIGNIFICANT CHANGE UP (ref 32–36)
MCV RBC AUTO: 92.9 FL — SIGNIFICANT CHANGE UP (ref 80–100)
MONOCYTES # BLD AUTO: 0.46 K/UL — SIGNIFICANT CHANGE UP (ref 0–0.9)
MONOCYTES NFR BLD AUTO: 6.1 % — SIGNIFICANT CHANGE UP (ref 2–14)
NEUTROPHILS # BLD AUTO: 3.95 K/UL — SIGNIFICANT CHANGE UP (ref 1.8–7.4)
NEUTROPHILS NFR BLD AUTO: 51.8 % — SIGNIFICANT CHANGE UP (ref 43–77)
NITRITE UR-MCNC: NEGATIVE — SIGNIFICANT CHANGE UP
PH UR: 6.5 — SIGNIFICANT CHANGE UP (ref 5–8)
PLATELET # BLD AUTO: 219 K/UL — SIGNIFICANT CHANGE UP (ref 150–400)
POTASSIUM SERPL-MCNC: 4.6 MMOL/L — SIGNIFICANT CHANGE UP (ref 3.5–5.3)
POTASSIUM SERPL-SCNC: 4.6 MMOL/L — SIGNIFICANT CHANGE UP (ref 3.5–5.3)
PROT SERPL-MCNC: 7.5 G/DL — SIGNIFICANT CHANGE UP (ref 6–8.3)
PROT UR-MCNC: ABNORMAL
RBC # BLD: 5.1 M/UL — SIGNIFICANT CHANGE UP (ref 4.2–5.8)
RBC # FLD: 14.4 % — SIGNIFICANT CHANGE UP (ref 10.3–14.5)
SARS-COV-2 RNA SPEC QL NAA+PROBE: SIGNIFICANT CHANGE UP
SODIUM SERPL-SCNC: 138 MMOL/L — SIGNIFICANT CHANGE UP (ref 135–145)
SP GR SPEC: 1.03 — HIGH (ref 1.01–1.02)
TOXICOLOGY SCREEN, DRUGS OF ABUSE, SERUM RESULT: SIGNIFICANT CHANGE UP
UROBILINOGEN FLD QL: SIGNIFICANT CHANGE UP
WBC # BLD: 7.62 K/UL — SIGNIFICANT CHANGE UP (ref 3.8–10.5)
WBC # FLD AUTO: 7.62 K/UL — SIGNIFICANT CHANGE UP (ref 3.8–10.5)

## 2021-03-23 RX ORDER — LURASIDONE HYDROCHLORIDE 40 MG/1
80 TABLET ORAL ONCE
Refills: 0 | Status: COMPLETED | OUTPATIENT
Start: 2021-03-23 | End: 2021-03-23

## 2021-03-23 RX ORDER — AMITRIPTYLINE HCL 25 MG
25 TABLET ORAL ONCE
Refills: 0 | Status: COMPLETED | OUTPATIENT
Start: 2021-03-23 | End: 2021-03-23

## 2021-03-23 RX ORDER — ONDANSETRON 8 MG/1
4 TABLET, FILM COATED ORAL ONCE
Refills: 0 | Status: COMPLETED | OUTPATIENT
Start: 2021-03-23 | End: 2021-03-23

## 2021-03-23 RX ADMIN — ONDANSETRON 4 MILLIGRAM(S): 8 TABLET, FILM COATED ORAL at 01:53

## 2021-03-23 RX ADMIN — Medication 25 MILLIGRAM(S): at 05:45

## 2021-03-23 RX ADMIN — Medication 30 MILLIGRAM(S): at 05:45

## 2021-03-23 RX ADMIN — LURASIDONE HYDROCHLORIDE 80 MILLIGRAM(S): 40 TABLET ORAL at 04:39

## 2021-03-23 NOTE — ED ADULT NURSE NOTE - OBJECTIVE STATEMENT
Patient is a 65y male, A&Ox4, ambulatory with cane, has cane at bedside, steady gait observed when ambulating to bathroom. Patient has a history of alcohol abuse, is requesting detox. Patient respirations are even and unlabored, speech is clear, no signs of withdrawal. IV initiated 20 gauge right antecubital, labs drawn and sent. Stretcher in lowest position, wheels locked, call bell in reach.

## 2021-03-23 NOTE — ED PROVIDER NOTE - NS ED ROS FT
General: denies fever, chills  HENT: denies nasal congestion, sore throat, rhinorrhea  Eyes: denies vision changes  CV: denies chest pain  Resp: denies difficulty breathing, cough  Abdominal: +nausea, denies vomiting, diarrhea, abdominal pain, blood in stool, dark stool  : denies pain with urination  MSK: denies recent trauma  Neuro: +headaches, denies numbness, tingling, dizziness, lightheadedness.  Skin: denies new rashes  Endocrine: denies recent weight loss

## 2021-03-23 NOTE — ED ADULT NURSE NOTE - INTERVENTIONS DEFINITIONS
[place] to call system/Call bell, personal items and telephone within reach/Instruct patient to call for assistance/Physically safe environment: no spills, clutter or unnecessary equipment/Stretcher in lowest position, wheels locked, appropriate side rails in place

## 2021-03-23 NOTE — ED PROVIDER NOTE - ATTENDING CONTRIBUTION TO CARE
DERMATOLOGY initial NOTE    Referred: Yes Nuris Strange MD for the lesion of concern described below    Verbal permission granted by patient to leave a detailed message with medical information on answering machine at phone number given? yes    If female, are you pregnant, trying to become pregnant, or breastfeeding? No    Occupation: Student/minor    Prior history of skin cancer- No  Family history of skin cancer- No    PAST MEDICAL HISTORY:  [x]  None  []  Melanoma    []  Asthma []  Skin Cancer  []  Eczema []  Keloids  []  Allergies []  Psoriasis  []  Vitiligo  []  Alopecia areata      REVIEW OF SYSTEMS:  Yes No  []  [x]  Headaches/vision changes  []  [x]  Fevers/chills/sweats   []  [x]  Unintentional weight loss  []  [x]  Sore throat  []  [x]  Runny nose/cough  []  [x]  Nausea/vomiting  []  [x]  Abdominal pain  []  [x]  Chest pain  []  [x]  Difficulty breathing  []  [x]  Easy bruising or bleeding  []  [x]  Diarrhea/constipation      Medical Assistant/Nurse notes reviewed and accepted.    Chief Complaint   Patient presents with   • Derm Problem         HPI:   13 year old  female is here for a particular concern.    The patient notes a lesion(s) of concern.  Location: right  forehead  Duration:  14 year(s)  Symptoms:  no associated symptoms   Changes over time:  growing in size and sticking out more than past year. The mother is concerned about these changes.  Treatments:  none    Denies any other skin complaints.Has not noted new/changing moles or symptomatic lesions.      OBJECTIVE:  Well developed, well-nourished, alert, oriented and in no acute distress.    Skin: Examination of the scalp/body hair, face, neck, ears, eyelids/conjunctiva and lips/oral mucosa was performed.   See below     PLAN:  Nevus with congenital features- There is a 8x6cm brown macule with dotted pigmented network which fades towards the periphery on the right forehead. Reassured patient that this is benign appearing. It is slightly  larger than the remaining nevi and continues to change, which is why the mother and daughter would prefer removal with histologic confirmation of benign behavior. Scheduled removal in excision clinic.        Return in about 1 month (around 3/23/2017) for surgery time slot (3:30pm).     Cc: Nuris Strange MD    On 2/23/2017, Ruth CAZARES MA scribed the services personally performed by Latesha Kamara MD  I, Dr. Latesha Kamara, attest that I have reviewed the scribe's note and edited as appropriate. I also personally performed the history of present illness, clinical impressions and plan.   66 yo with PMH of ETOH abuse and bipolar presenting requesting detox.  States was clean from ETOH for a long period but relapsed 7 months ago and now is feeling withdrawal symptoms of shaking, chills, nausea, and headache.  Symptoms were severe earlier on this evening so he drank 3/4 bottle of port wine and two shots at 1800.  No history of severe withdrawal in the past.    Gen: Well appearing in NAD  Head: NC/AT  Neck: trachea midline  Resp:  No distress  Ext: no deformities  Neuro:  A&O appears non focal no tremors  Skin:  Warm and dry as visualized  Psych:  Normal affect and mood     66 yo with PMH of ETOH abuse and bipolar reporting concerns for withdrawal and ETOH abuse need detox.  No signs of active withdrawal at this time.  ETOH level is only 63 and no symptoms.  Will observe for development of symptoms.  SW unable to find placement at this hour but in the morning SBIRT team is here to try and assist with detox placement.  If starts to withdraw before then will need to be admitted

## 2021-03-23 NOTE — ED ADULT NURSE NOTE - CHIEF COMPLAINT QUOTE
pt requesting alcohol detox. Pt states last had half liter wine and 3 shots of bennie approx 1 hour ago. Pt denies SI/HI/AH/VH or physical complaints. Denies hx withdrawal seizures, Pt hx of bipolar. Belongings placed across from 21

## 2021-03-23 NOTE — ED PROVIDER NOTE - OBJECTIVE STATEMENT
Enedina Decker MD: 66yo M with PMH of bipolar disorder, EtOH use disorder who presents requesting for detox. Pt states he drank 3/4 bottle of wine and 2 glasses of bennie at 6PM. Believes he is in withdrawal. Complaining of head and neck pain and nausea. Reported that he was admitted for liver problems at [facility] a month ago for 1 week. Drinks daily. No hx of withdrawal seizures, DTs, fever, chills, SOB, CP, V/D, abd pain. Denies SI/HI/hallucinations.

## 2021-03-23 NOTE — ED PROVIDER NOTE - NSFOLLOWUPINSTRUCTIONS_ED_ALL_ED_FT
Please see the attached sheet for information regarding outpatient treatment services.     1. TAKE ALL MEDICATIONS AS DIRECTED.    2. FOR PAIN OR FEVER YOU CAN TAKE IBUPROFEN (MOTRIN, ADVIL) OR ACETAMINOPHEN (TYLENOL) AS NEEDED, AS DIRECTED ON PACKAGING.  3. FOLLOW UP WITH YOUR PRIMARY DOCTOR WITHIN 5 DAYS AS DIRECTED.  4. IF YOU HAD LABS OR IMAGING DONE, YOU WERE GIVEN COPIES OF ALL LABS AND/OR IMAGING RESULTS FROM YOUR ER VISIT--PLEASE TAKE THEM WITH YOU TO YOUR FOLLOW UP APPOINTMENTS.  5. IF NEEDED, CALL PATIENT ACCESS SERVICES AT [phone] TO FIND A PRIMARY CARE PHYSICIAN.  OR CALL [phone] TO MAKE AN APPOINTMENT WITH THE CLINIC.  6. RETURN TO THE ER FOR ANY WORSENING SYMPTOMS OR CONCERNS.     Alcohol Abuse    Alcohol intoxication occurs when the amount of alcohol that a person has consumed impairs his or her ability to mentally and physically function. Chronic alcohol consumption can also lead to a variety of health issues including neurological disease, stomach disease, heart disease, liver disease, etc. Do not drive after drinking alcohol. Drinking enough alcohol to end up in an Emergency Room suggests you may have an alcohol abuse problem. Seek help at a drug addiction center.    SEEK IMMEDIATE MEDICAL CARE IF YOU HAVE ANY OF THE FOLLOWING SYMPTOMS: seizures, vomiting blood, blood in your stool, lightheadedness/dizziness, or becoming shaky to tremulous when you stop drinking.

## 2021-03-23 NOTE — ED PROVIDER NOTE - CLINICAL SUMMARY MEDICAL DECISION MAKING FREE TEXT BOX
Enedina Decker MD: 64yo M with PMH of bipolar disorder, EtOH use disorder who presents requesting for detox. Pt does not appear to be withdrawal at this time. Will check labs, given anti-emetics, librium, reassess

## 2021-03-23 NOTE — ED PROVIDER NOTE - PATIENT PORTAL LINK FT
You can access the FollowMyHealth Patient Portal offered by [facility] by registering at the following website: [email or web address]. By joining [facility]’s FollowMyHealth portal, you will also be able to view your health information using other applications (apps) compatible with our system.

## 2021-03-23 NOTE — ED PROVIDER NOTE - PHYSICAL EXAMINATION
CONSTITUTIONAL: Nontoxic, well nourished, well developed, elderly male, resting comfortably in no acute distress  HEAD: Normocephalic; atraumatic  EYES: Normal inspection, EOMI  ENMT: External appears normal; normal oropharynx  NECK: Supple; non-tender; no cervical lymphadenopathy  CARD: RRR; no audible murmurs, rubs, or gallops  RESP: No respiratory distress, lungs ctab/l  ABD: Soft, non-distended; non-tender; no rebound or guarding  EXT: No LE pitting edema or calf tenderness; distal pulses intact with good capillary refill  SKIN: Warm, dry, intact  NEURO: aaox3, moving all extremities spontaneously   PSYCH: No SI/HI/hallucinations, clear speech, mood is anxious

## 2021-03-23 NOTE — ED PROVIDER NOTE - PROGRESS NOTE DETAILS
Enedina Decker MD: Labs wnl. Pt not in acute withdrawals. Stable. SBIRT in the AM Enedina Decker MD: Pt remains stable. No signs of withdrawals. Pt stable, no clinical signs of withdrawal. SBIRT not available this AM. Discussed with patient following up as outpatient.   Pantera Young M.D. PGY-3

## 2021-03-23 NOTE — ED PROVIDER NOTE - NSFOLLOWUPCLINICS_GEN_ALL_ED_FT
[facility]  Detox  [address] [place] [address]  Phone: [phone]  Fax: [phone]  Follow Up Time:     Detox [facility]  Detox  [address] [place] [address]  Phone: [phone]  Fax: [phone]  Follow Up Time:     [facility] Psych Dept of Substance Abuse  Psychiatry Substance Abuse  [address] [place] [address]  Phone: [phone]  Fax:   Follow Up Time:     [facility]  Detox Mgmt  [address] [place] [address]  Phone: [phone]  Fax:   Follow Up Time:

## 2021-09-02 NOTE — ED PROVIDER NOTE - OBJECTIVE STATEMENT
Abormal VS: Temp > 100F or < 96.8F; SBP < 90 mmHG; HR > 120bpm; Resp > 24/min
61yo male pt, ambulatory c/o right rib pain s/p fell down from roof (12feet) 5days ago, hit stairs. Pt stated the pain is worsen with movement. Denies LOC or head injury. Denies neck or back pain. Denies sensory changes or weakness to extremities. Denies SOB/ ABD pain or N/V/D. Denies pelvic or hip pain. Denies fever, chills or recent sickness.

## 2021-10-06 NOTE — ASU PATIENT PROFILE, ADULT - NSCAFFEINETYPE_GEN_ALL_CORE_SD
coffee

## 2022-03-06 NOTE — ED ADULT TRIAGE NOTE - HEIGHT IN FEET
[Nutrition/ Exercise/ Weight Management] : nutrition, exercise, weight management [Vitamins/Supplements] : vitamins/supplements [Breast Self Exam] : breast self exam [Bladder Hygiene] : bladder hygiene [Contraception/ Emergency Contraception/ Safe Sexual Practices] : contraception, emergency contraception, safe sexual practices [STD (testing, results, tx)] : STD (testing, results, tx) [Medication Management] : medication management 5

## 2023-11-01 NOTE — ED BEHAVIORAL HEALTH ASSESSMENT NOTE - SUMMARY
Visit summary:  - Overall kidney function and labs look good  - Your hemoglobin blood count actually looks good although your iron studies are low. Given your extreme tiredness I am going to order another iron infusion which we will help to facilitate  - Also, please go for lab work now this will check a thyroid profile to make sure this is not contributing to your weakness or low sodium, and we will recheck a magnesium level on you now at this point.  - In regards to treating your low sodium continue modest fluid restriction and your current diuretic regimen or water pill regimen        1. Medication changes today:  No changes at this time    2. Please go for non fasting  lab work at this time    3. Please take 1 week a blood pressure readings prior to next appointment    AS FOLLOWS  MORNING AND EVENING, SITTING AND STANDING as follows:  TAKE THE MORNING READINGS BEFORE ANY MEDICATIONS AND WHEN YOU ARE RELAXED FOR SEVERAL MINUTES  TAKE THE EVENING READINGS:  BETWEEN 7-10 P.M.; PRIOR TO ANY MEDICATIONS; AT LEAST IN OUR  FROM DINNER; AND CERTAINLY AFTER RELAXING FOR A FEW MINUTES  PLEASE INCLUDE HEART RATE WITH YOUR BLOOD PRESSURE READINGS  When taking standing readings, keep your arm supported at heart level and not dangling  Make sure you are sitting with your back supported and feet on the ground and do not cross your legs or feet  Make sure you have not taken any coffee or caffeine products or exercised or smoke cigarettes at least 30 minutes before taking your blood pressure  Then please mail these readings into the office    4. We will arrange for iron infusion at this time at [facility]    5. Please go for nonfasting lab work but in the morning in 3 months    6.   Follow-up in 6 months  Please bring in 1 week a blood pressure readings morning evening, sitting and standing is outlined above  PLEASE BRING AN YOUR BLOOD PRESSURE MACHINE TO CORRELATE WITH THE OFFICE MACHINE AT THIS NEXT SCHEDULED VISIT  Please go for fasting lab work 1-2 weeks prior to your appointment      7. General instructions:  AVOID SALT BUT NOT ADDING AN READING LABELS TO MAKE SURE THERE IS LOW-SALT IN THE FOOD THAT YOU ARE EATING  Goal is less than 2 g of sodium intake or less than 5 g of sodium chloride intake per day    Avoid nonsteroidal anti-inflammatory drugs such as Naprosyn, ibuprofen, Aleve, Advil, Celebrex, Meloxicam (Mobic) etc.  You can use Tylenol as needed if you do not have any liver condition to be concerned about    Avoid medications such as Sudafed or decongestants and antihistamines that contained the D component which is the decongestant. You can take antihistamines without the decongestant or D component. Try to avoid medications such as pantoprazole or  Protonix/Nexium or Esomeprazole)/Prilosec or omeprazole/Prevacid or lansoprazole/AcipHex or Rabeprazole. If you are able to, use Pepcid as this is safer for your kidneys. Try to exercise at least 30 minutes 3 days a week to begin with with an ultimate goal of 5 days a week for at least 30 minutes    Try to lose 5-10 lb by your next visit    Please do not drink more than 2 glasses of alcohol/wine on a daily basis as this may contribute to your high blood pressure. Patient is a 63 yo  man, with self reported history of PTSD, depression and anxiety, with multiple remote inpatient hospitalizations and ECT treatments, currently in treatment with Rohan Summers, prescribed Valium 10mg q6h, Cymbalta 30mg, Buspirone 30mg 1.5 tab BID, Trazodone 150mg, who presents reporting that he was referred by psychiatrist that he needs to come to the ER for an objective evaluation.

## 2023-12-02 NOTE — ED ADULT NURSE NOTE - SUICIDE SCREENING QUESTION 2
Patient arrives ambulatory with complaints of left wrist pain that began one month ago. Patient states he had an xray and evaluation three weeks ago that did not show a fracture.  Patient states the pain has continued.  
Yes

## 2024-05-26 NOTE — ED PROVIDER NOTE - NSCAREINITIATED _GEN_ER
May 26, 2024      [facility]  [address] [place] [address]  Phone: [phone]  Fax: [phone]       Patient: Mauricio Talavera   YOB: 2008  Date of Visit: 05/26/2024    To Whom It May Concern:    KASSIDY Talavera  was at [facility] on 05/26/2024. The patient may return to work on 05/27/2024 with no restrictions. If you have any questions or concerns, or if I can be of further assistance, please do not hesitate to contact me.    Sincerely,    Lourdes Hernández, CMA     
[place](NP)
